# Patient Record
Sex: FEMALE | Race: BLACK OR AFRICAN AMERICAN | Employment: FULL TIME | URBAN - METROPOLITAN AREA
[De-identification: names, ages, dates, MRNs, and addresses within clinical notes are randomized per-mention and may not be internally consistent; named-entity substitution may affect disease eponyms.]

---

## 2017-03-23 ENCOUNTER — HOSPITAL ENCOUNTER (EMERGENCY)
Age: 42
Discharge: HOME | End: 2017-03-23
Attending: EMERGENCY MEDICINE
Payer: SELF-PAY

## 2017-03-23 VITALS
RESPIRATION RATE: 16 BRPM | DIASTOLIC BLOOD PRESSURE: 85 MMHG | BODY MASS INDEX: 26.11 KG/M2 | WEIGHT: 133 LBS | TEMPERATURE: 98.5 F | SYSTOLIC BLOOD PRESSURE: 133 MMHG | OXYGEN SATURATION: 97 % | HEIGHT: 60 IN

## 2017-03-23 DIAGNOSIS — J02.9 PHARYNGITIS, UNSPECIFIED ETIOLOGY: Primary | ICD-10-CM

## 2017-03-23 DIAGNOSIS — K12.0 CANKER SORES ORAL: ICD-10-CM

## 2017-03-23 PROCEDURE — 99282 EMERGENCY DEPT VISIT SF MDM: CPT

## 2017-03-23 RX ORDER — IBUPROFEN 600 MG/1
600 TABLET ORAL
Qty: 20 TAB | Refills: 0 | Status: SHIPPED | OUTPATIENT
Start: 2017-03-23 | End: 2017-07-14

## 2017-03-23 RX ORDER — AMOXICILLIN 500 MG/1
500 TABLET, FILM COATED ORAL 3 TIMES DAILY
Qty: 30 TAB | Refills: 0 | Status: SHIPPED | OUTPATIENT
Start: 2017-03-23 | End: 2017-07-14

## 2017-03-23 NOTE — DISCHARGE INSTRUCTIONS
Canker Sore: Care Instructions  Your Care Instructions  Canker sores are painful white sores in the mouth. They usually begin with a tingling feeling, followed by a red spot or bump that turns white. Canker sores appear most often on the tongue, inside the cheeks, and inside the lips. They can be very painful and can make talking, eating, and drinking difficult. A canker sore may form after an injury or stretching of tissues in the mouth, which can happen, for example, during a dental procedure or teeth cleaning. If you accidentally bite your tongue or the inside of your cheek, you may end up with a canker sore. Other possible causes are infection, certain foods, and stress. Canker sores are not contagious. The pain from your canker sore should decrease in 7 to 10 days, and it should heal completely in 1 to 3 weeks. In most cases, a canker sore will go away by itself. Home treatment can ease pain and discomfort. If you have a large or deep canker sore that does not seem to be getting better after 2 weeks, your doctor may prescribe medicine. Canker sores often come back again. Follow-up care is a key part of your treatment and safety. Be sure to make and go to all appointments, and call your doctor if you are having problems. Its also a good idea to know your test results and keep a list of the medicines you take. How can you care for yourself at home? · Drink cold liquids, such as water or iced tea, or eat flavored ice pops or frozen juices. Use a straw to keep the liquid from coming in contact with your canker sore. · Eat soft, bland foods that are easy to chew and swallow, such as ice cream, custard, applesauce, cottage cheese, macaroni and cheese, soft-cooked eggs, yogurt, or cream soups. · Cut foods into small pieces, or grind, mash, blend, or puree foods to make them easier to chew and swallow.   · While your canker sore heals, avoid coffee, chocolate, spicy and salty foods, citrus fruits, nuts, seeds, and tomatoes. · To soothe your canker sore and help it heal:  ¨ Use an over-the-counter numbing medicine, such as Orabase or Anbesol. ¨ Dab a bit of Milk of Magnesia on the canker sore 3 or 4 times a day. · Put ice on your sore to reduce the pain. · Take anti-inflammatory medicines to reduce pain, as needed. These include ibuprofen (Advil, Motrin) and naproxen (Aleve). Read and follow all instructions on the label. · Use a soft-bristle toothbrush, and brush your teeth well but carefully. · Do not smoke or use spit tobacco. Tobacco can cause mouth problems and slow healing. If you need help quitting, talk to your doctor about stop-smoking programs and medicines. These can increase your chances of quitting for good. When should you call for help? Call your doctor now or seek immediate medical care if:  · You have signs of infection, such as:  ¨ Increased pain, swelling, warmth, or redness. ¨ Red streaks leading from the canker sore. ¨ Pus draining from the canker sore. ¨ A fever. Watch closely for changes in your health, and be sure to contact your doctor if:  · Your canker sore gets worse or does not go away after 2 weeks of home treatment. · You get more canker sores. · You have any new symptoms, such as diarrhea, a headache, or a skin rash. Where can you learn more? Go to http://suzanne-simran.info/. Enter R111 in the search box to learn more about \"Canker Sore: Care Instructions. \"  Current as of: August 9, 2016  Content Version: 11.1  © 5871-1619 [employer]. Care instructions adapted under license by [employer] (which disclaims liability or warranty for this information). If you have questions about a medical condition or this instruction, always ask your healthcare professional. [place] [address] any warranty or liability for your use of this information.          Sore Throat: Care Instructions  Your Care Instructions    Infection by bacteria or a virus causes most sore throats. Cigarette smoke, dry air, air pollution, allergies, and yelling can also cause a sore throat. Sore throats can be painful and annoying. Fortunately, most sore throats go away on their own. If you have a bacterial infection, your doctor may prescribe antibiotics. Follow-up care is a key part of your treatment and safety. Be sure to make and go to all appointments, and call your doctor if you are having problems. It's also a good idea to know your test results and keep a list of the medicines you take. How can you care for yourself at home? · If your doctor prescribed antibiotics, take them as directed. Do not stop taking them just because you feel better. You need to take the full course of antibiotics. · Gargle with warm salt water once an hour to help reduce swelling and relieve discomfort. Use 1 teaspoon of salt mixed in 1 cup of warm water. · Take an over-the-counter pain medicine, such as acetaminophen (Tylenol), ibuprofen (Advil, Motrin), or naproxen (Aleve). Read and follow all instructions on the label. · Be careful when taking over-the-counter cold or flu medicines and Tylenol at the same time. Many of these medicines have acetaminophen, which is Tylenol. Read the labels to make sure that you are not taking more than the recommended dose. Too much acetaminophen (Tylenol) can be harmful. · Drink plenty of fluids. Fluids may help soothe an irritated throat. Hot fluids, such as tea or soup, may help decrease throat pain. · Use over-the-counter throat lozenges to soothe pain. Regular cough drops or hard candy may also help. These should not be given to young children because of the risk of choking. · Do not smoke or allow others to smoke around you. If you need help quitting, talk to your doctor about stop-smoking programs and medicines. These can increase your chances of quitting for good. · Use a vaporizer or humidifier to add moisture to your bedroom. Follow the directions for cleaning the machine. When should you call for help? Call your doctor now or seek immediate medical care if:  · You have new or worse trouble swallowing. · Your sore throat gets much worse on one side. Watch closely for changes in your health, and be sure to contact your doctor if you do not get better as expected. Where can you learn more? Go to http://suzanne-simran.info/. Enter [id number] [id number] in the search box to learn more about \"Sore Throat: Care Instructions. \"  Current as of: July 29, 2016  Content Version: 11.1  © 9826-8835 [employer]. Care instructions adapted under license by [employer] (which disclaims liability or warranty for this information). If you have questions about a medical condition or this instruction, always ask your healthcare professional. [address] any warranty or liability for your use of this information.

## 2017-03-23 NOTE — ED PROVIDER NOTES
HPI Comments: 10:28 AM Grady Lepe is a 35 y.o. female with a history of sickle cell trait and asthma who presents to the ED c/o cold sxs including body aches, fever, congestion, and HA since Wednesday. 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 Pt is also c/o cold sore and weakness in her legs, she thinks possibly due to being dehydrated onset Friday. No other acute concerns. The history is provided by the patient. Past Medical History:   Diagnosis Date    Asthma     Sickle cell trait ([facility] [address]        History reviewed. No pertinent surgical history. Family History:   Problem Relation Age of Onset    Diabetes Mother     Hypertension Mother     Diabetes Father     Hypertension Father     Stroke Father        Social History     Social History    Marital status:      Spouse name: N/A    Number of children: N/A    Years of education: N/A     Occupational History    [employer]     Social History Main Topics    Smoking status: Current Every Day Smoker     Packs/day: 0.25     Years: 15.00    Smokeless tobacco: Not on file    Alcohol use 1.0 - 1.5 oz/week     2 - 3 Cans of beer per week      Comment: occassionally    Drug use: Yes     Special: Marijuana    Sexual activity: Yes     Partners: Male     Birth control/ protection: Condom     Other Topics Concern    Not on file     Social History Narrative         ALLERGIES: Review of patient's allergies indicates no known allergies. Review of Systems   Constitutional: Positive for fever. HENT: Positive for congestion. Respiratory: Negative for cough and shortness of breath. Cardiovascular: Negative for chest pain and leg swelling. Gastrointestinal: Negative for abdominal pain, nausea and vomiting. Genitourinary: Negative for dysuria. Musculoskeletal: Negative. (+) Body aches   Neurological: Positive for headaches. Negative for speech difficulty.    All other systems reviewed and are negative. Vitals:    03/23/17 1008   BP: 133/85   Resp: 16   Temp: 98.5 °F (36.9 °C)   SpO2: 97%   Weight: 60.3 kg (133 lb)   Height: 5' (1.524 m)            Physical Exam   Constitutional: She is oriented to person, place, and time. No distress. HENT:   Head: Atraumatic. Eyes: Conjunctivae are normal.   Neck: Normal range of motion. Neck supple. Cardiovascular: Normal rate, regular rhythm and normal heart sounds. Pulmonary/Chest: Effort normal and breath sounds normal. She exhibits no tenderness. Abdominal: Soft. Bowel sounds are normal. She exhibits no distension. There is no tenderness. There is no rebound and no guarding. Musculoskeletal: Normal range of motion. She exhibits no edema or tenderness. Neurological: She is alert and oriented to person, place, and time. No cranial nerve deficit. Skin: Skin is warm and dry. Psychiatric: She has a normal mood and affect. Nursing note and vitals reviewed. MDM  Number of Diagnoses or Management Options  Canker sores oral:   Pharyngitis, unspecified etiology:      Amount and/or Complexity of Data Reviewed  Clinical lab tests: ordered and reviewed    Risk of Complications, Morbidity, and/or Mortality  Presenting problems: low  Diagnostic procedures: low  Management options: low      ED Course       Procedures  . Vitals:  Patient Vitals for the past 12 hrs:   Temp Resp BP SpO2   03/23/17 1008 98.5 °F (36.9 °C) 16 133/85 97 %       Progress Notes: 10:32 AM Pt evaluated at this time and is resting comfortably in NAD. Discussed results and findings, as well as, diagnosis and plan for discharge. Pt verbalizes understanding and agreement with plan. All questions addressed at this time. Disposition: DC    Diagnosis:     Follow-up Information     None          Scribe Attestation:     Kaitlynn Jurado, scrjasoning for and in the presence of Savanna Moore MD March 23, 2017     Signed by:  Jan Barahona, March 23, 2017 at 10:32 AM     Physician Attestation:   I personally performed the services described in this documentation, reviewed and edited the documentation which was dictated to the scribe in my presence, and it accurately records my words and actions.  Regine Jansen MD  March 23, 2017

## 2017-03-23 NOTE — ED NOTES
Alonzo Shafer is a 35 y.o. female that was discharged in stable. Pt was accompanied by self. Pt is driving. The patients diagnosis, condition and treatment were explained to  patient and aftercare instructions were given. The patient verbalized understanding. Patient armband removed and shredded.

## 2017-03-23 NOTE — LETTER
NOTIFICATION RETURN TO WORK / SCHOOL 
 
3/23/2017 10:33 AM 
 
Ms. Esequiel Burgess [facility] To Whom It May Concern: 
 
Esequiel Burgess is currently under the care of [id number] [facility] EMERGENCY DEPT. She will return to motherhood on 3/27/2017or until symptoms subside. If there are questions or concerns please have the patient contact our office. Sincerely, Bryan Jewell MD

## 2017-07-14 ENCOUNTER — HOSPITAL ENCOUNTER (EMERGENCY)
Age: 42
Discharge: HOME | End: 2017-07-14
Attending: EMERGENCY MEDICINE | Admitting: EMERGENCY MEDICINE
Payer: SELF-PAY

## 2017-07-14 ENCOUNTER — APPOINTMENT (OUTPATIENT)
Dept: GENERAL RADIOLOGY | Age: 42
End: 2017-07-14
Attending: EMERGENCY MEDICINE
Payer: SELF-PAY

## 2017-07-14 VITALS
DIASTOLIC BLOOD PRESSURE: 99 MMHG | RESPIRATION RATE: 18 BRPM | WEIGHT: 124 LBS | BODY MASS INDEX: 24.35 KG/M2 | OXYGEN SATURATION: 99 % | SYSTOLIC BLOOD PRESSURE: 144 MMHG | HEART RATE: 80 BPM | TEMPERATURE: 98 F | HEIGHT: 60 IN

## 2017-07-14 DIAGNOSIS — S39.012A BACK STRAIN, INITIAL ENCOUNTER: Primary | ICD-10-CM

## 2017-07-14 PROCEDURE — 73030 X-RAY EXAM OF SHOULDER: CPT

## 2017-07-14 PROCEDURE — 99282 EMERGENCY DEPT VISIT SF MDM: CPT

## 2017-07-14 RX ORDER — CYCLOBENZAPRINE HCL 5 MG
5 TABLET ORAL
Qty: 9 TAB | Refills: 0 | Status: SHIPPED | OUTPATIENT
Start: 2017-07-14 | End: 2017-07-17

## 2017-07-14 NOTE — LETTER
[facility] EMERGENCY DEPT 
[address] [facility] [id number] [phone] Work/School Note Date: 7/14/2017 To Whom It May concern: 
 
Kavitha Carmen was seen and treated today in the emergency room by the following provider(s): 
Attending Provider: Rajeev Brewster DO Physician Assistant: GENET Alanis. Kavitha Carmen may return to work on 7/16/17. Yola Sage Sincerely, GENET Alanis
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
## 2017-07-15 NOTE — ED NOTES
Patient advised to f/u with primary care related to elevated blood pressures. She voiced understanding.

## 2017-07-15 NOTE — DISCHARGE INSTRUCTIONS
Back Strain: Care Instructions  Your Care Instructions    Back strain happens when you overstretch, or pull, a muscle in your back. You may hurt your back in an accident or when you exercise or lift something. Most back pain will get better with rest and time. You can take care of yourself at home to help your back heal.  Follow-up care is a key part of your treatment and safety. Be sure to make and go to all appointments, and call your doctor if you are having problems. It's also a good idea to know your test results and keep a list of the medicines you take. How can you care for yourself at home? · Try to stay as active as you can, but stop or reduce any activity that causes pain. · Put ice or a cold pack on the sore muscle for 10 to 20 minutes at a time to stop swelling. Try this every 1 to 2 hours for 3 days (when you are awake) or until the swelling goes down. Put a thin cloth between the ice pack and your skin. · After 2 or 3 days, apply a heating pad on low or a warm cloth to your back. Some doctors suggest that you go back and forth between hot and cold treatments. · Take pain medicines exactly as directed. ¨ If the doctor gave you a prescription medicine for pain, take it as prescribed. ¨ If you are not taking a prescription pain medicine, ask your doctor if you can take an over-the-counter medicine. · Try sleeping on your side with a pillow between your legs. Or put a pillow under your knees when you lie on your back. These measures can ease pain in your lower back. · Return to your usual level of activity slowly. When should you call for help? Call 911 anytime you think you may need emergency care. For example, call if:  · You are unable to move a leg at all. Call your doctor now or seek immediate medical care if:  · You have new or worse symptoms in your legs, belly, or buttocks. Symptoms may include:  ¨ Numbness or tingling. ¨ Weakness. ¨ Pain.   · You lose bladder or bowel control. Watch closely for changes in your health, and be sure to contact your doctor if you are not getting better as expected. Where can you learn more? Go to http://suzanne-simran.info/. Enter X679 in the search box to learn more about \"Back Strain: Care Instructions. \"  Current as of: March 21, 2017  Content Version: 11.3  © 3725-0818 [employer]. Care instructions adapted under license by [employer] (which disclaims liability or warranty for this information). If you have questions about a medical condition or this instruction, always ask your healthcare professional. [place] [address] any warranty or liability for your use of this information.

## 2017-10-27 ENCOUNTER — HOSPITAL ENCOUNTER (EMERGENCY)
Age: 42
Discharge: HOME | End: 2017-10-27
Attending: EMERGENCY MEDICINE
Payer: SELF-PAY

## 2017-10-27 VITALS
WEIGHT: 130 LBS | DIASTOLIC BLOOD PRESSURE: 99 MMHG | SYSTOLIC BLOOD PRESSURE: 173 MMHG | OXYGEN SATURATION: 98 % | TEMPERATURE: 98.2 F | HEIGHT: 60 IN | RESPIRATION RATE: 18 BRPM | HEART RATE: 78 BPM | BODY MASS INDEX: 25.52 KG/M2

## 2017-10-27 DIAGNOSIS — M54.2 NECK PAIN: Primary | ICD-10-CM

## 2017-10-27 DIAGNOSIS — T14.8XXA MUSCLE STRAIN: ICD-10-CM

## 2017-10-27 DIAGNOSIS — R03.0 ELEVATED BLOOD PRESSURE READING: ICD-10-CM

## 2017-10-27 PROCEDURE — 99283 EMERGENCY DEPT VISIT LOW MDM: CPT

## 2017-10-27 PROCEDURE — 74011250637 HC RX REV CODE- 250/637: Performed by: PHYSICIAN ASSISTANT

## 2017-10-27 RX ORDER — DIAZEPAM 5 MG/1
5 TABLET ORAL
Qty: 12 TAB | Refills: 0 | Status: SHIPPED | OUTPATIENT
Start: 2017-10-27 | End: 2018-03-11

## 2017-10-27 RX ORDER — IBUPROFEN 600 MG/1
600 TABLET ORAL
Status: COMPLETED | OUTPATIENT
Start: 2017-10-27 | End: 2017-10-27

## 2017-10-27 RX ADMIN — IBUPROFEN 600 MG: 600 TABLET ORAL at 15:40

## 2017-10-27 NOTE — DISCHARGE INSTRUCTIONS
Neck Pain: Care Instructions  Your Care Instructions    You can have neck pain anywhere from the bottom of your head to the top of your shoulders. It can spread to the upper back or arms. Injuries, painting a ceiling, sleeping with your neck twisted, staying in one position for too long, and many other activities can cause neck pain. Most neck pain gets better with home care. Your doctor may recommend medicine to relieve pain or relax your muscles. He or she may suggest exercise and physical therapy to increase flexibility and relieve stress. You may need to wear a special (cervical) collar to support your neck for a day or two. Follow-up care is a key part of your treatment and safety. Be sure to make and go to all appointments, and call your doctor if you are having problems. It's also a good idea to know your test results and keep a list of the medicines you take. How can you care for yourself at home? · Try using a heating pad on a low or medium setting for 15 to 20 minutes every 2 or 3 hours. Try a warm shower in place of one session with the heating pad. · You can also try an ice pack for 10 to 15 minutes every 2 to 3 hours. Put a thin cloth between the ice and your skin. · Take pain medicines exactly as directed. ¨ If the doctor gave you a prescription medicine for pain, take it as prescribed. ¨ If you are not taking a prescription pain medicine, ask your doctor if you can take an over-the-counter medicine. · If your doctor recommends a cervical collar, wear it exactly as directed. When should you call for help? Call your doctor now or seek immediate medical care if:  ? · You have new or worsening numbness in your arms, buttocks or legs. ? · You have new or worsening weakness in your arms or legs. (This could make it hard to stand up.)   ? · You lose control of your bladder or bowels. ? Watch closely for changes in your health, and be sure to contact your doctor if:  ? · Your neck pain is getting worse. ? · You are not getting better after 1 week. ? · You do not get better as expected. Where can you learn more? Go to http://suzanne-simran.info/. Enter [id number] in the search box to learn more about \"Neck Pain: Care Instructions. \"  Current as of: March 21, 2017  Content Version: 11.4  © 2266-1756 [employer]. Care instructions adapted under license by [employer] (which disclaims liability or warranty for this information). If you have questions about a medical condition or this instruction, always ask your healthcare professional. [address] any warranty or liability for your use of this information.

## 2017-10-27 NOTE — LETTER
NOTIFICATION RETURN TO WORK / SCHOOL 
 
10/27/2017 3:31 PM 
 
Ms. Mily Dodson [address] [facility] To Whom It May Concern: 
 
Mily Dodson is currently under the care of [id number] [facility] EMERGENCY DEPT. She will return to work/school on 10/29/17 If there are questions or concerns please have the patient contact our office.  
 
 
 
Sincerely, 
 
GENET Earl

## 2017-10-27 NOTE — ED PROVIDER NOTES
HPI Comments: Aure Bear is a 35 y.o. Female c/o Rt sided, sharp, constant neck pain, non-radiating, 5/10, started 10 days ago, atraumatic. No recent falls. Pt reports pain started after moving boxes/furniture at home after death of her son. Pt states she feels the rt side of her neck is swollen. Pt also tried heating pad for pain. Tried Tylenol, Motrin, and Aleve for pain before bed. Pt works at [employer] and has to lift boxes. Pt denies numbness/tingling to UEs. The history is provided by the patient. Past Medical History:   Diagnosis Date    Asthma     Sickle cell trait ([facility] [address]        No past surgical history on file. Family History:   Problem Relation Age of Onset    Diabetes Mother     Hypertension Mother     Diabetes Father     Hypertension Father     Stroke Father        Social History     Social History    Marital status:      Spouse name: N/A    Number of children: N/A    Years of education: N/A     Occupational History    [email or web address]     Social History Main Topics    Smoking status: Current Every Day Smoker     Packs/day: 0.25     Years: 15.00    Smokeless tobacco: Not on file    Alcohol use 1.0 - 1.5 oz/week     2 - 3 Cans of beer per week      Comment: occassionally    Drug use: No    Sexual activity: Yes     Partners: Male     Birth control/ protection: Condom     Other Topics Concern    Not on file     Social History Narrative         ALLERGIES: Review of patient's allergies indicates no known allergies. Review of Systems   Constitutional: Negative for chills, fatigue and fever. Cardiovascular: Negative for chest pain and palpitations. Gastrointestinal: Negative for abdominal pain, diarrhea and nausea. Musculoskeletal: Positive for neck pain and neck stiffness. Negative for back pain and gait problem. Skin: Negative for rash. Neurological: Negative for weakness and numbness.         No tingling to extremities       There were no vitals filed for this visit. Physical Exam   Constitutional: She is oriented to person, place, and time. She appears well-developed and well-nourished. Appears in mild distress due to pain   HENT:   Head: Normocephalic and atraumatic. Eyes: EOM are normal. Pupils are equal, round, and reactive to light. Neck: Muscular tenderness present. Decreased range of motion present. Cardiovascular: Normal rate and regular rhythm. Pulmonary/Chest: Effort normal and breath sounds normal.   Musculoskeletal:        Cervical back: She exhibits decreased range of motion and tenderness. She exhibits no bony tenderness and no swelling. Back:    Decreased neck rotation, flexion, and extension due to Rt sided pain  Point tenderness to Rt SCM and trapezius. Neurological: She is alert and oriented to person, place, and time. Skin: Skin is warm and intact. MDM  Number of Diagnoses or Management Options  Elevated blood pressure reading:   Muscle strain:   Neck pain:   Diagnosis management comments: 10 days of atraumatic, rt sided neck pain and stiffness exacerbated by lifting. No sensory deficits, no weakness. Musculoskeletal pain. Give NSAIDs and muscle relaxant. Risk of Complications, Morbidity, and/or Mortality  Presenting problems: low  Diagnostic procedures: low  Management options: low    Patient Progress  Patient progress: stable    ED Course       Procedures      Vitals:  Patient Vitals for the past 12 hrs:   Temp Pulse Resp BP SpO2   10/27/17 1504 98.2 °F (36.8 °C) 78 18 (!) 173/99 98 %         Medications ordered:   Medications   ibuprofen (MOTRIN) tablet 600 mg (not administered)         Lab findings:  No results found for this or any previous visit (from the past 12 hour(s)). X-Ray, CT or other radiology findings or impressions:  No orders to display       Progress notes, Consult notes or additional Procedure notes:       Disposition:  Diagnosis: No diagnosis found.     Disposition: home    Follow-up Information     None           Patient's Medications    No medications on file

## 2017-10-27 NOTE — ED TRIAGE NOTES
C/o right sided neck pain x ~1 week after heavy lifting at work. States taking Tylenol, Aleve & Ibuprofen.

## 2018-03-11 ENCOUNTER — HOSPITAL ENCOUNTER (EMERGENCY)
Age: 43
Discharge: HOME | End: 2018-03-11
Attending: EMERGENCY MEDICINE | Admitting: EMERGENCY MEDICINE
Payer: SELF-PAY

## 2018-03-11 VITALS
BODY MASS INDEX: 25.86 KG/M2 | DIASTOLIC BLOOD PRESSURE: 94 MMHG | WEIGHT: 137 LBS | OXYGEN SATURATION: 99 % | HEIGHT: 61 IN | TEMPERATURE: 98.6 F | RESPIRATION RATE: 20 BRPM | SYSTOLIC BLOOD PRESSURE: 145 MMHG | HEART RATE: 75 BPM
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
## 2018-03-11 DIAGNOSIS — S16.1XXA STRAIN OF NECK MUSCLE, INITIAL ENCOUNTER: ICD-10-CM

## 2018-03-11 DIAGNOSIS — R03.0 ELEVATED BLOOD PRESSURE READING: ICD-10-CM

## 2018-03-11 DIAGNOSIS — M62.838 MUSCLE SPASM: ICD-10-CM

## 2018-03-11 PROCEDURE — 99282 EMERGENCY DEPT VISIT SF MDM: CPT

## 2018-03-11 RX ORDER — CYCLOBENZAPRINE HCL 10 MG
10 TABLET ORAL
Qty: 12 TAB | Refills: 0 | Status: SHIPPED | OUTPATIENT
Start: 2018-03-11 | End: 2018-10-05

## 2018-03-11 RX ORDER — IBUPROFEN 600 MG/1
600 TABLET ORAL
Qty: 20 TAB | Refills: 0 | Status: SHIPPED | OUTPATIENT
Start: 2018-03-11 | End: 2018-10-05

## 2018-03-11 NOTE — LETTER
[address] [place] [address] [facility] 
[address] [facility] [address] [phone] Work/School Note Date: 3/11/2018 To Whom It May concern: 
 
Leland Philippe was seen and treated today in the emergency room by the following provider(s): 
Attending Provider: Reyes Granda MD 
Nurse Practitioner: Leah Clarke NP. Leland Philippe may return to work on 03/12/2018. Sincerely, Leah Clarke NP
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
## 2018-10-05 ENCOUNTER — APPOINTMENT (OUTPATIENT)
Dept: GENERAL RADIOLOGY | Age: 43
End: 2018-10-05
Attending: PHYSICIAN ASSISTANT
Payer: SUBSIDIZED

## 2018-10-05 ENCOUNTER — HOSPITAL ENCOUNTER (EMERGENCY)
Age: 43
Discharge: HOME | End: 2018-10-05
Attending: EMERGENCY MEDICINE
Payer: SUBSIDIZED

## 2018-10-05 ENCOUNTER — APPOINTMENT (OUTPATIENT)
Dept: CT IMAGING | Age: 43
End: 2018-10-05
Attending: PHYSICIAN ASSISTANT
Payer: SUBSIDIZED

## 2018-10-05 VITALS
WEIGHT: 139 LBS | BODY MASS INDEX: 27.29 KG/M2 | DIASTOLIC BLOOD PRESSURE: 93 MMHG | RESPIRATION RATE: 18 BRPM | SYSTOLIC BLOOD PRESSURE: 149 MMHG | HEART RATE: 82 BPM | TEMPERATURE: 98.7 F | OXYGEN SATURATION: 98 % | HEIGHT: 60 IN

## 2018-10-05 DIAGNOSIS — S39.012A BACK STRAIN, INITIAL ENCOUNTER: ICD-10-CM
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
## 2018-10-05 DIAGNOSIS — S16.1XXA NECK STRAIN, INITIAL ENCOUNTER: Primary | ICD-10-CM

## 2018-10-05 PROCEDURE — 74011250637 HC RX REV CODE- 250/637: Performed by: PHYSICIAN ASSISTANT

## 2018-10-05 PROCEDURE — 72100 X-RAY EXAM L-S SPINE 2/3 VWS: CPT

## 2018-10-05 PROCEDURE — 72072 X-RAY EXAM THORAC SPINE 3VWS: CPT

## 2018-10-05 PROCEDURE — 99282 EMERGENCY DEPT VISIT SF MDM: CPT

## 2018-10-05 PROCEDURE — 72125 CT NECK SPINE W/O DYE: CPT

## 2018-10-05 RX ORDER — IBUPROFEN 600 MG/1
600 TABLET ORAL
Status: COMPLETED | OUTPATIENT
Start: 2018-10-05 | End: 2018-10-05

## 2018-10-05 RX ORDER — ACETAMINOPHEN 500 MG
500 TABLET ORAL
Status: COMPLETED | OUTPATIENT
Start: 2018-10-05 | End: 2018-10-05

## 2018-10-05 RX ORDER — CYCLOBENZAPRINE HCL 5 MG
10 TABLET ORAL 3 TIMES DAILY
Qty: 18 TAB | Refills: 0 | Status: SHIPPED | OUTPATIENT
Start: 2018-10-05 | End: 2018-10-08

## 2018-10-05 RX ORDER — IBUPROFEN 600 MG/1
600 TABLET ORAL
Qty: 20 TAB | Refills: 0 | Status: SHIPPED | OUTPATIENT
Start: 2018-10-05 | End: 2019-04-10

## 2018-10-05 RX ADMIN — IBUPROFEN 600 MG: 600 TABLET ORAL at 19:00

## 2018-10-05 RX ADMIN — ACETAMINOPHEN 500 MG: 500 TABLET ORAL at 19:00
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
## 2019-04-10 ENCOUNTER — HOSPITAL ENCOUNTER (EMERGENCY)
Age: 44
Discharge: HOME | End: 2019-04-10
Attending: EMERGENCY MEDICINE
Payer: MEDICAID

## 2019-04-10 VITALS
RESPIRATION RATE: 18 BRPM | OXYGEN SATURATION: 99 % | BODY MASS INDEX: 27.09 KG/M2 | TEMPERATURE: 98.7 F | DIASTOLIC BLOOD PRESSURE: 109 MMHG | HEIGHT: 60 IN | HEART RATE: 83 BPM | WEIGHT: 138 LBS | SYSTOLIC BLOOD PRESSURE: 156 MMHG

## 2019-04-10 DIAGNOSIS — R03.0 ELEVATED BLOOD PRESSURE READING: ICD-10-CM

## 2019-04-10 DIAGNOSIS — J01.80 OTHER ACUTE SINUSITIS, RECURRENCE NOT SPECIFIED: Primary | ICD-10-CM

## 2019-04-10 PROCEDURE — 99282 EMERGENCY DEPT VISIT SF MDM: CPT

## 2019-04-10 RX ORDER — LORATADINE 10 MG/1
10 TABLET ORAL
COMMUNITY
End: 2019-09-17

## 2019-04-10 RX ORDER — IBUPROFEN 800 MG/1
800 TABLET ORAL
Qty: 16 TAB | Refills: 0 | Status: SHIPPED | OUTPATIENT
Start: 2019-04-10 | End: 2019-04-17

## 2019-04-10 RX ORDER — IBUPROFEN 200 MG
400 TABLET ORAL
COMMUNITY
End: 2019-04-10

## 2019-04-10 RX ORDER — FLUTICASONE PROPIONATE 50 MCG
2 SPRAY, SUSPENSION (ML) NASAL
Qty: 1 BOTTLE | Refills: 0 | Status: SHIPPED | OUTPATIENT
Start: 2019-04-10 | End: 2019-09-17

## 2019-04-10 RX ORDER — AZITHROMYCIN 250 MG/1
TABLET, FILM COATED ORAL
Qty: 6 TAB | Refills: 0 | Status: SHIPPED | OUTPATIENT
Start: 2019-04-10 | End: 2019-04-15

## 2019-04-10 RX ORDER — DIPHENHYDRAMINE HCL 25 MG
25 CAPSULE ORAL
COMMUNITY
End: 2019-09-17

## 2019-04-10 NOTE — DISCHARGE INSTRUCTIONS
Patient Education     If you were prescribed any medication take as directed. Follow up with your primary care physician or with specialist as directed. Return to the emergency room with any new or worsening conditions. Elevated Blood Pressure: Care Instructions  Your Care Instructions    Blood pressure is a measure of how hard the blood pushes against the walls of your arteries. It's normal for blood pressure to go up and down throughout the day. But if it stays up over time, you have high blood pressure. Two numbers tell you your blood pressure. The first number is the systolic pressure. It shows how hard the blood pushes when your heart is pumping. The second number is the diastolic pressure. It shows how hard the blood pushes between heartbeats, when your heart is relaxed and filling with blood. An ideal blood pressure in adults is less than 120/80 (say \"120 over 80\"). High blood pressure is 140/90 or higher. You have high blood pressure if your top number is 140 or higher or your bottom number is 90 or higher, or both. The main test for high blood pressure is simple, fast, and painless. To diagnose high blood pressure, your doctor will test your blood pressure at different times. After testing your blood pressure, your doctor may ask you to test it again when you are home. If you are diagnosed with high blood pressure, you can work with your doctor to make a long-term plan to manage it. Follow-up care is a key part of your treatment and safety. Be sure to make and go to all appointments, and call your doctor if you are having problems. It's also a good idea to know your test results and keep a list of the medicines you take. How can you care for yourself at home? · Do not smoke. Smoking increases your risk for heart attack and stroke. If you need help quitting, talk to your doctor about stop-smoking programs and medicines. These can increase your chances of quitting for good.   · Stay at a healthy weight. · Try to limit how much sodium you eat to less than 2,300 milligrams (mg) a day. Your doctor may ask you to try to eat less than 1,500 mg a day. · Be physically active. Get at least 30 minutes of exercise on most days of the week. Walking is a good choice. You also may want to do other activities, such as running, swimming, cycling, or playing tennis or team sports. · Avoid or limit alcohol. Talk to your doctor about whether you can drink any alcohol. · Eat plenty of fruits, vegetables, and low-fat dairy products. Eat less saturated and total fats. · Learn how to check your blood pressure at home. When should you call for help? Call your doctor now or seek immediate medical care if:  ? · Your blood pressure is much higher than normal (such as 180/110 or higher). ? · You think high blood pressure is causing symptoms such as:  ¨ Severe headache. ¨ Blurry vision. ? Watch closely for changes in your health, and be sure to contact your doctor if:  ? · You do not get better as expected. Where can you learn more? Go to http://suzanne-simran.info/. Enter J932 in the search box to learn more about \"Elevated Blood Pressure: Care Instructions. \"  Current as of: September 21, 2016  Content Version: 11.4  © 8145-9479 [employer]. Care instructions adapted under license by [employer] (which disclaims liability or warranty for this information). If you have questions about a medical condition or this instruction, always ask your healthcare professional. [place] [address] any warranty or liability for your use of this information. Saline Nasal Washes: Care Instructions  Your Care Instructions  Saline nasal washes help keep the nasal passages open by washing out thick or dried mucus. This simple remedy can help relieve symptoms of allergies, sinusitis, and colds.  It also can make the nose feel more comfortable by keeping the mucous membranes moist. You may notice a little burning sensation in your nose the first few times you use the solution, but this usually gets better in a few days. Follow-up care is a key part of your treatment and safety. Be sure to make and go to all appointments, and call your doctor if you are having problems. It's also a good idea to know your test results and keep a list of the medicines you take. How can you care for yourself at home? · You can buy premixed saline solution in a squeeze bottle or other sinus rinse products at a drugstore. Read and follow the instructions on the label. · You also can make your own saline solution by adding 1 teaspoon of salt and 1 teaspoon of baking soda to 2 cups of distilled water. · If you use a homemade solution, pour a small amount into a clean bowl. Using a rubber bulb syringe, squeeze the syringe and place the tip in the salt water. Pull a small amount of the salt water into the syringe by relaxing your hand. · Sit down with your head tilted slightly back. Do not lie down. Put the tip of the bulb syringe or the squeeze bottle a little way into one of your nostrils. Gently drip or squirt a few drops into the nostril. Repeat with the other nostril. Some sneezing and gagging are normal at first.  · Gently blow your nose. · Wipe the syringe or bottle tip clean after each use. · Repeat this 2 or 3 times a day. · Use nasal washes gently if you have nosebleeds often. When should you call for help? Watch closely for changes in your health, and be sure to contact your doctor if:    · You often get nosebleeds.     · You have problems doing the nasal washes. Where can you learn more? Go to http://szuanne-simran.info/. Enter [id number] in the search box to learn more about \"Saline Nasal Washes: Care Instructions. \"  Current as of: March 27, 2018  Content Version: 11.9  © 4977-8814 [employer].  Care instructions adapted under license by [employer] (which disclaims liability or warranty for this information). If you have questions about a medical condition or this instruction, always ask your healthcare professional. [address] any warranty or liability for your use of this information. Patient Education        Sinusitis: Care Instructions  Your Care Instructions    Sinusitis is an infection of the lining of the sinus cavities in your head. Sinusitis often follows a cold. It causes pain and pressure in your head and face. In most cases, sinusitis gets better on its own in 1 to 2 weeks. But some mild symptoms may last for several weeks. Sometimes antibiotics are needed. Follow-up care is a key part of your treatment and safety. Be sure to make and go to all appointments, and call your doctor if you are having problems. It's also a good idea to know your test results and keep a list of the medicines you take. How can you care for yourself at home? · Take an over-the-counter pain medicine, such as acetaminophen (Tylenol), ibuprofen (Advil, Motrin), or naproxen (Aleve). Read and follow all instructions on the label. · If the doctor prescribed antibiotics, take them as directed. Do not stop taking them just because you feel better. You need to take the full course of antibiotics. · Be careful when taking over-the-counter cold or flu medicines and Tylenol at the same time. Many of these medicines have acetaminophen, which is Tylenol. Read the labels to make sure that you are not taking more than the recommended dose. Too much acetaminophen (Tylenol) can be harmful. · Breathe warm, moist air from a steamy shower, a hot bath, or a sink filled with hot water. Avoid cold, dry air. Using a humidifier in your home may help. Follow the directions for cleaning the machine. · Use saline (saltwater) nasal washes to help keep your nasal passages open and wash out mucus and bacteria.  You can buy saline nose drops at a grocery store or drugstore. Or you can make your own at home by adding 1 teaspoon of salt and 1 teaspoon of baking soda to 2 cups of distilled water. If you make your own, fill a bulb syringe with the solution, insert the tip into your nostril, and squeeze gently. Jerad Payam your nose. · Put a hot, wet towel or a warm gel pack on your face 3 or 4 times a day for 5 to 10 minutes each time. · Try a decongestant nasal spray like oxymetazoline (Afrin). Do not use it for more than 3 days in a row. Using it for more than 3 days can make your congestion worse. When should you call for help? Call your doctor now or seek immediate medical care if:    · You have new or worse swelling or redness in your face or around your eyes.     · You have a new or higher fever.    Watch closely for changes in your health, and be sure to contact your doctor if:    · You have new or worse facial pain.     · The mucus from your nose becomes thicker (like pus) or has new blood in it.     · You are not getting better as expected. Where can you learn more? Go to http://suzanne-simran.info/. Enter H740 in the search box to learn more about \"Sinusitis: Care Instructions. \"  Current as of: March 27, 2018  Content Version: 11.9  © 4976-8674 [employer]. Care instructions adapted under license by [employer] (which disclaims liability or warranty for this information). If you have questions about a medical condition or this instruction, always ask your healthcare professional. [place] [address] any warranty or liability for your use of this information.

## 2019-04-10 NOTE — ED PROVIDER NOTES
EMERGENCY DEPARTMENT HISTORY AND PHYSICAL EXAM 
 
1:20 PM 
 
 
Date: 4/10/2019 Patient Name: Janice Velazquez History of Presenting Illness Chief Complaint Patient presents with  Sinus Pain  Nasal Swelling History Provided By: Patient Additional History (Context): Janice Velazquez is a 37 y.o. female with past medical history of seasonal allergies, asthma and Sickle cell trait who presents with complaint of nasal congestion runny nose for the past few days. Now she states that there is some swelling on the left side of her nose. States it is painful when she blows her nose. She denies any headache, dizziness, fever or chills, or sore throat but does have tender lymph node on the left side of her neck she states. She mentioned that she used some new eye makeup recently but no rash or itching. No other complaints PCP: None Past History Past Medical History: 
Past Medical History:  
Diagnosis Date  Asthma  Sickle cell trait ([address] Past Surgical History: 
History reviewed. No pertinent surgical history. Family History: 
Family History Problem Relation Age of Onset  Diabetes Mother  Hypertension Mother  Diabetes Father  Hypertension Father  Stroke Father Social History: 
Social History Tobacco Use  Smoking status: Current Every Day Smoker Packs/day: 0.25 Years: 15.00 Pack years: 3.75  Smokeless tobacco: Never Used Substance Use Topics  Alcohol use: Yes Alcohol/week: 1.0 - 1.5 oz Types: 2 - 3 Cans of beer per week Comment: occassionally  Drug use: No  
  Types: Marijuana Allergies: 
No Known Allergies Review of Systems Review of Systems Constitutional: Negative for chills and fever. HENT: Positive for congestion and rhinorrhea. Negative for sore throat and trouble swallowing. Eyes: Negative for visual disturbance. Respiratory: Negative for cough, shortness of breath and wheezing. Cardiovascular: Negative for chest pain. Gastrointestinal: Negative for abdominal pain, nausea and vomiting. Endocrine: Negative for polyuria. Genitourinary: Negative for dysuria. Musculoskeletal: Negative for arthralgias and neck stiffness. Skin: Negative for pallor and rash. Neurological: Negative for dizziness, weakness, numbness and headaches. Hematological: Does not bruise/bleed easily. Psychiatric/Behavioral: Negative for confusion and dysphoric mood. All other systems reviewed and are negative. Physical Exam  
 
Visit Vitals BP (!) 156/109 (BP 1 Location: Left arm, BP Patient Position: Sitting) Pulse 83 Temp 98.7 °F (37.1 °C) Resp 18 Ht 5' (1.524 m) Wt 62.6 kg (138 lb) LMP 03/27/2019 SpO2 99% BMI 26.95 kg/m² Physical Exam  
Constitutional: She is oriented to person, place, and time. She appears well-developed and well-nourished. No distress. HENT:  
Head: Normocephalic and atraumatic. Mouth/Throat: Oropharynx is clear and moist.  
Tender left paranasal sinus region Some mild swelling at that region Purulent drainage from both nares, more in the left nares Swollen turbinates Eyes: Pupils are equal, round, and reactive to light. Conjunctivae are normal. No scleral icterus. Neck: Normal range of motion. Neck supple. Cardiovascular: Normal rate and intact distal pulses. Pulmonary/Chest: Effort normal and breath sounds normal. No respiratory distress. She has no wheezes. Musculoskeletal: Normal range of motion. She exhibits no edema. Lymphadenopathy:  
  She has cervical adenopathy (Left-sided submandibular). Neurological: She is alert and oriented to person, place, and time. No cranial nerve deficit. Coordination normal.  
Skin: Skin is warm and dry. She is not diaphoretic. Psychiatric: Her behavior is normal.  
Nursing note and vitals reviewed. Diagnostic Study Results Labs - No results found for this or any previous visit (from the past 12 hour(s)). Radiologic Studies - No orders to display Medical Decision Making I am the first provider for this patient. I reviewed the vital signs, available nursing notes, past medical history, past surgical history, family history and social history. Vital Signs-Reviewed the patient's vital signs. Provider Notes (Medical Decision Making): DDX: Acute sinusitis, allergic rhinitis We will treat for sinusitis with antibiotic, Flonase, saline nasal spray, Motrin Give  follow-up as well as PCP referral in [address] [place] [address] Medications - No data to display ED Course: Progress Notes, Reevaluation, and Consults: 
I have reassessed the patient. I have discussed the workup, results and plan with the patient and patient is in agreement. Patient has no new complaints. Patient will be prescribed Z-Jordana, Motrin, Flonase, saline nasal spray. Patient was discharge in stable condition. Patient was given outpatient follow up. Patient is to return to emergency department if any new or worsening condition. Diagnosis Clinical Impression: 1. Other acute sinusitis, recurrence not specified Disposition: Discharged Follow-up Information Follow up With Specialties Details Why Contact Info Dea [address]  Call today  DR. [facility] [address] [address] 
[phone] [facility]  Schedule an appointment as soon as possible for a visit in 2 days  [address] [facility] [address] [facility] [address] 
[phone] [address] [facility] EMERGENCY DEPT Emergency Medicine  As needed, If symptoms worsen [address] [place] [address] [id number] [phone] Patient's Medications Start Taking AZITHROMYCIN (ZITHROMAX Z-JORDANA) 250 MG TABLET    Take as written FLUTICASONE PROPIONATE (FLONASE) 50 MCG/ACTUATION NASAL SPRAY    2 Sprays by Both Nostrils route daily as needed for Rhinitis or Allergies. Indications: inflammation of the nose due to an allergy IBUPROFEN (MOTRIN) 800 MG TABLET    Take 1 Tab by mouth every six (6) hours as needed (pain; fever) for up to 7 days. SODIUM CHLORIDE (SALINE NASAL) 0.65 % NASAL SQUEEZE BOTTLE    0.1 mL by Both Nostrils route every three (3) hours as needed for Congestion. Indications: stuffy nose Continue Taking DIPHENHYDRAMINE (BENADRYL) 25 MG CAPSULE    Take 25 mg by mouth every six (6) hours as needed. LORATADINE (CLARITIN) 10 MG TABLET    Take 10 mg by mouth. These Medications have changed No medications on file Stop Taking IBUPROFEN (MOTRIN IB) 200 MG TABLET    Take 400 mg by mouth. IBUPROFEN (MOTRIN) 600 MG TABLET    Take 1 Tab by mouth every six (6) hours as needed for Pain. DO Boby Berg medical dictation software was used for portions of this report. Unintended transcription errors may occur. My signature above authenticates this document and my orders, the final   
diagnosis (es), discharge prescription (s), and instructions in the Epic   
record. If you have any questions please contact [phone].

## 2019-04-10 NOTE — LETTER
NOTIFICATION RETURN TO WORK / SCHOOL 
 
4/10/2019 2:40 PM 
 
Ms. Suzan Leblanc [address] [facility] To Whom It May Concern: 
 
Suzan Leblanc is currently under the care of [facility] EMERGENCY DEPT. She will return to work/school on: 4/12/19 If there are questions or concerns please have the patient contact our office. Sincerely, 
 
 
Dr. Yordy Manuel

## 2019-06-14 ENCOUNTER — HOSPITAL ENCOUNTER (OUTPATIENT)
Dept: LAB | Age: 44
Discharge: HOME | End: 2019-06-14
Payer: MEDICAID

## 2019-06-14 LAB
ALBUMIN SERPL-MCNC: 3.6 G/DL (ref 3.4–5)
ALBUMIN/GLOB SERPL: 1 {RATIO} (ref 0.8–1.7)
ALP SERPL-CCNC: 53 U/L (ref 45–117)
ALT SERPL-CCNC: 25 U/L (ref 13–56)
ANION GAP SERPL CALC-SCNC: 6 MMOL/L (ref 3–18)
AST SERPL-CCNC: 19 U/L (ref 15–37)
BASOPHILS # BLD: 0 K/UL (ref 0–0.06)
BASOPHILS NFR BLD: 0 % (ref 0–3)
BILIRUB SERPL-MCNC: 0.3 MG/DL (ref 0.2–1)
BUN SERPL-MCNC: 16 MG/DL (ref 7–18)
BUN/CREAT SERPL: 20 (ref 12–20)
CALCIUM SERPL-MCNC: 9.2 MG/DL (ref 8.5–10.1)
CHLORIDE SERPL-SCNC: 108 MMOL/L (ref 100–108)
CHOLEST SERPL-MCNC: 188 MG/DL
CO2 SERPL-SCNC: 26 MMOL/L (ref 21–32)
CREAT SERPL-MCNC: 0.81 MG/DL (ref 0.6–1.3)
DIFFERENTIAL METHOD BLD: ABNORMAL
EOSINOPHIL # BLD: 0.1 K/UL (ref 0–0.4)
EOSINOPHIL NFR BLD: 1 % (ref 0–5)
ERYTHROCYTE [DISTWIDTH] IN BLOOD BY AUTOMATED COUNT: 15.3 % (ref 11.6–14.5)
GLOBULIN SER CALC-MCNC: 3.5 G/DL (ref 2–4)
GLUCOSE SERPL-MCNC: 87 MG/DL (ref 74–99)
HCT VFR BLD AUTO: 34.2 % (ref 35–45)
HDLC SERPL-MCNC: 104 MG/DL (ref 40–60)
HDLC SERPL: 1.8 {RATIO} (ref 0–5)
HGB BLD-MCNC: 11.7 G/DL (ref 12–16)
LDLC SERPL CALC-MCNC: 68 MG/DL (ref 0–100)
LIPID PROFILE,FLP: ABNORMAL
LYMPHOCYTES # BLD: 3.2 K/UL (ref 0.8–3.5)
LYMPHOCYTES NFR BLD: 36 % (ref 20–51)
MCH RBC QN AUTO: 26.5 PG (ref 24–34)
MCHC RBC AUTO-ENTMCNC: 34.2 G/DL (ref 31–37)
MCV RBC AUTO: 77.6 FL (ref 74–97)
MONOCYTES # BLD: 0.5 K/UL (ref 0–1)
MONOCYTES NFR BLD: 6 % (ref 2–9)
NEUTS SEG # BLD: 5.1 K/UL (ref 1.8–8)
NEUTS SEG NFR BLD: 57 % (ref 42–75)
PLATELET # BLD AUTO: 360 K/UL (ref 135–420)
PLATELET COMMENTS,PCOM: ABNORMAL
PMV BLD AUTO: 10.4 FL (ref 9.2–11.8)
POTASSIUM SERPL-SCNC: 4.4 MMOL/L (ref 3.5–5.5)
PROT SERPL-MCNC: 7.1 G/DL (ref 6.4–8.2)
RBC # BLD AUTO: 4.41 M/UL (ref 4.2–5.3)
RBC MORPH BLD: ABNORMAL
SODIUM SERPL-SCNC: 140 MMOL/L (ref 136–145)
TRIGL SERPL-MCNC: 80 MG/DL (ref ?–150)
TSH SERPL DL<=0.05 MIU/L-ACNC: 1.29 UIU/ML (ref 0.36–3.74)
VLDLC SERPL CALC-MCNC: 16 MG/DL
WBC # BLD AUTO: 8.9 K/UL (ref 4.6–13.2)

## 2019-06-14 PROCEDURE — 85025 COMPLETE CBC W/AUTO DIFF WBC: CPT

## 2019-06-14 PROCEDURE — 36415 COLL VENOUS BLD VENIPUNCTURE: CPT

## 2019-06-14 PROCEDURE — 80053 COMPREHEN METABOLIC PANEL: CPT

## 2019-06-14 PROCEDURE — 84443 ASSAY THYROID STIM HORMONE: CPT

## 2019-06-14 PROCEDURE — 80061 LIPID PANEL: CPT

## 2019-09-17 ENCOUNTER — HOSPITAL ENCOUNTER (EMERGENCY)
Age: 44
Discharge: HOME | End: 2019-09-17
Attending: EMERGENCY MEDICINE
Payer: MEDICAID

## 2019-09-17 VITALS
SYSTOLIC BLOOD PRESSURE: 138 MMHG | HEART RATE: 89 BPM | OXYGEN SATURATION: 99 % | BODY MASS INDEX: 25.32 KG/M2 | WEIGHT: 129 LBS | TEMPERATURE: 98.2 F | RESPIRATION RATE: 18 BRPM | HEIGHT: 60 IN | DIASTOLIC BLOOD PRESSURE: 90 MMHG
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
## 2019-09-17 DIAGNOSIS — S76.012A MUSCLE STRAIN OF GLUTEAL REGION, LEFT, INITIAL ENCOUNTER: Primary | ICD-10-CM

## 2019-09-17 PROCEDURE — 99282 EMERGENCY DEPT VISIT SF MDM: CPT

## 2019-09-17 RX ORDER — LISINOPRIL 5 MG/1
5 TABLET ORAL DAILY
COMMUNITY

## 2019-09-17 RX ORDER — CYCLOBENZAPRINE HCL 10 MG
10 TABLET ORAL
Qty: 20 TAB | Refills: 0 | Status: SHIPPED | OUTPATIENT
Start: 2019-09-17 | End: 2019-10-07

## 2019-09-17 NOTE — LETTER
[address] [place] [address] [facility] EMERGENCY DEPT 
[address] [place] [address] [phone] Work/School Note Date: 9/17/2019 To Whom It May concern: 
 
Marvin Rodrigez was seen and treated today in the emergency room by the following provider(s): 
Attending Provider: Ysabel Jean MD 
Physician Assistant: GENET Rodriguez. Marvin Rodrigez may return to work on 9/18/19. Sincerely, MELLISSA Martinez RN
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
## 2019-09-17 NOTE — DISCHARGE INSTRUCTIONS
Patient Education        Hip Flexor Strain: Rehab Exercises  Introduction  Here are some examples of exercises for you to try. The exercises may be suggested for a condition or for rehabilitation. Start each exercise slowly. Ease off the exercises if you start to have pain. You will be told when to start these exercises and which ones will work best for you. How to do the exercises  Pelvic tilt with marching    1. Lie on your back with your knees bent and your feet flat on the floor. 2. Tighten your belly muscles and buttocks, and press your lower back to the floor. 3. Keeping your knees bent, lift and then lower one leg up off the floor, and then lift and lower your other leg like you are marching. Each time you lift your leg, hold that position for about 6 seconds before lowering your leg. 4. Repeat 8 to 12 times. Scissors    1. Lie on your back with your knees bent at a 90-degree angle and your feet off the floor. 2. Tighten your belly muscles and buttocks, and press your lower back to the floor. 3. Slowly straighten one leg, and hold that position for about 6 seconds. Your leg should be about 12 inches off the floor. Bring that leg back to the starting position, and then straighten your other leg. Hold that position for about 6 seconds, and then switch legs again. 4. Repeat 8 to 12 times. Hamstring stretch (lying down)    1. Lie flat on your back with your legs straight. If you feel discomfort in your back, place a small towel roll under your lower back. 2. Holding the back of your affected leg for support, lift your leg straight up and toward your body until you feel a stretch at the back of your thigh. 3. Hold the stretch for at least 30 seconds. 4. Repeat 2 to 4 times. Quadricep and hip flexor stretch (lying on side)    1. Lie on your side with your good leg flat on the floor and your hand supporting your head.   2. Bend your top leg, and reach behind you to grab the front of that foot or ankle with your other hand. 3. Stretch your leg back by pulling your foot toward your buttock. You will feel the stretch in the front of your thigh. If this causes stress on your knee, do not do this stretch. 4. Hold the stretch for at least 15 to 30 seconds. 5. Repeat 2 to 4 times. Hip flexor stretch (kneeling)    1. Kneel on your affected leg and bend your good leg out in front of you, with that foot flat on the floor. If you feel discomfort in the front of your knee, place a towel under your knee. 2. Keeping your back straight, slowly push your hips forward until you feel a stretch in the upper thigh of your back leg and hip. 3. Hold the stretch for at least 15 to 30 seconds. 4. Repeat 2 to 4 times. Hip flexor stretch (edge of table)    1. Lie flat on your back on a table or flat bench, with your knees and lower legs hanging off the edge of the table. 2. Grab your good leg at the knee, and pull that knee back toward your chest. Relax your affected leg and let it hang down toward the floor until you feel a stretch in the upper thigh of your affected leg and hip. 3. Hold the stretch for at least 15 to 30 seconds. 4. Repeat 2 to 4 times. Follow-up care is a key part of your treatment and safety. Be sure to make and go to all appointments, and call your doctor if you are having problems. It's also a good idea to know your test results and keep a list of the medicines you take. Where can you learn more? Go to http://suzanne-simran.info/. Enter X022 in the search box to learn more about \"Hip Flexor Strain: Rehab Exercises. \"  Current as of: September 20, 2018  Content Version: 12.1  © 7798-4859 [employer], [employer]. Care instructions adapted under license by [employer] (which disclaims liability or warranty for this information).  If you have questions about a medical condition or this instruction, always ask your healthcare professional. Araceli Ayala disclaims any warranty or liability for your use of this information.
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
## 2021-06-06 ENCOUNTER — HOSPITAL ENCOUNTER (EMERGENCY)
Age: 46
Discharge: ANOTHER HOSPITAL | End: 2021-06-07
Attending: EMERGENCY MEDICINE
Payer: MEDICAID

## 2021-06-06 ENCOUNTER — APPOINTMENT (OUTPATIENT)
Dept: CT IMAGING | Age: 46
End: 2021-06-06
Attending: STUDENT IN AN ORGANIZED HEALTH CARE EDUCATION/TRAINING PROGRAM
Payer: MEDICAID

## 2021-06-06 ENCOUNTER — APPOINTMENT (OUTPATIENT)
Dept: GENERAL RADIOLOGY | Age: 46
End: 2021-06-06
Attending: STUDENT IN AN ORGANIZED HEALTH CARE EDUCATION/TRAINING PROGRAM
Payer: MEDICAID

## 2021-06-06 VITALS
OXYGEN SATURATION: 98 % | HEART RATE: 81 BPM | BODY MASS INDEX: 25.54 KG/M2 | WEIGHT: 130.07 LBS | RESPIRATION RATE: 17 BRPM | TEMPERATURE: 98.5 F | SYSTOLIC BLOOD PRESSURE: 136 MMHG | DIASTOLIC BLOOD PRESSURE: 80 MMHG | HEIGHT: 60 IN

## 2021-06-06 DIAGNOSIS — S82.142A CLOSED FRACTURE OF LEFT TIBIAL PLATEAU, INITIAL ENCOUNTER: Primary | ICD-10-CM

## 2021-06-06 PROCEDURE — 74011250636 HC RX REV CODE- 250/636: Performed by: EMERGENCY MEDICINE

## 2021-06-06 PROCEDURE — 96376 TX/PRO/DX INJ SAME DRUG ADON: CPT

## 2021-06-06 PROCEDURE — 74011250637 HC RX REV CODE- 250/637: Performed by: STUDENT IN AN ORGANIZED HEALTH CARE EDUCATION/TRAINING PROGRAM

## 2021-06-06 PROCEDURE — 73700 CT LOWER EXTREMITY W/O DYE: CPT

## 2021-06-06 PROCEDURE — 73590 X-RAY EXAM OF LOWER LEG: CPT

## 2021-06-06 PROCEDURE — 96374 THER/PROPH/DIAG INJ IV PUSH: CPT

## 2021-06-06 PROCEDURE — 96375 TX/PRO/DX INJ NEW DRUG ADDON: CPT

## 2021-06-06 PROCEDURE — 73562 X-RAY EXAM OF KNEE 3: CPT

## 2021-06-06 PROCEDURE — 73610 X-RAY EXAM OF ANKLE: CPT

## 2021-06-06 PROCEDURE — 74011250636 HC RX REV CODE- 250/636: Performed by: STUDENT IN AN ORGANIZED HEALTH CARE EDUCATION/TRAINING PROGRAM

## 2021-06-06 PROCEDURE — 99285 EMERGENCY DEPT VISIT HI MDM: CPT

## 2021-06-06 RX ORDER — HYDROMORPHONE HYDROCHLORIDE 1 MG/ML
0.5 INJECTION, SOLUTION INTRAMUSCULAR; INTRAVENOUS; SUBCUTANEOUS
Status: COMPLETED | OUTPATIENT
Start: 2021-06-06 | End: 2021-06-07

## 2021-06-06 RX ORDER — MORPHINE SULFATE 2 MG/ML
2 INJECTION, SOLUTION INTRAMUSCULAR; INTRAVENOUS
Status: COMPLETED | OUTPATIENT
Start: 2021-06-06 | End: 2021-06-06

## 2021-06-06 RX ORDER — MORPHINE SULFATE 4 MG/ML
4 INJECTION INTRAVENOUS ONCE
Status: COMPLETED | OUTPATIENT
Start: 2021-06-06 | End: 2021-06-06

## 2021-06-06 RX ORDER — ONDANSETRON 2 MG/ML
4 INJECTION INTRAMUSCULAR; INTRAVENOUS ONCE
Status: COMPLETED | OUTPATIENT
Start: 2021-06-06 | End: 2021-06-06

## 2021-06-06 RX ORDER — HYDROMORPHONE HYDROCHLORIDE 1 MG/ML
1 INJECTION, SOLUTION INTRAMUSCULAR; INTRAVENOUS; SUBCUTANEOUS ONCE
Status: COMPLETED | OUTPATIENT
Start: 2021-06-06 | End: 2021-06-06

## 2021-06-06 RX ORDER — KETOROLAC TROMETHAMINE 15 MG/ML
15 INJECTION, SOLUTION INTRAMUSCULAR; INTRAVENOUS
Status: COMPLETED | OUTPATIENT
Start: 2021-06-06 | End: 2021-06-06

## 2021-06-06 RX ORDER — ACETAMINOPHEN 500 MG
1000 TABLET ORAL ONCE
Status: COMPLETED | OUTPATIENT
Start: 2021-06-06 | End: 2021-06-06

## 2021-06-06 RX ORDER — AMLODIPINE BESYLATE 2.5 MG/1
2.5 TABLET ORAL DAILY
COMMUNITY

## 2021-06-06 RX ORDER — DIPHENHYDRAMINE HYDROCHLORIDE 50 MG/ML
25 INJECTION, SOLUTION INTRAMUSCULAR; INTRAVENOUS
Status: COMPLETED | OUTPATIENT
Start: 2021-06-06 | End: 2021-06-06

## 2021-06-06 RX ADMIN — DIPHENHYDRAMINE HYDROCHLORIDE 25 MG: 50 INJECTION INTRAMUSCULAR; INTRAVENOUS at 20:18

## 2021-06-06 RX ADMIN — MORPHINE SULFATE 4 MG: 4 INJECTION, SOLUTION INTRAMUSCULAR; INTRAVENOUS at 15:11

## 2021-06-06 RX ADMIN — HYDROMORPHONE HYDROCHLORIDE 1 MG: 1 INJECTION, SOLUTION INTRAMUSCULAR; INTRAVENOUS; SUBCUTANEOUS at 20:18

## 2021-06-06 RX ADMIN — MORPHINE SULFATE 2 MG: 2 INJECTION, SOLUTION INTRAMUSCULAR; INTRAVENOUS at 19:14

## 2021-06-06 RX ADMIN — ONDANSETRON 4 MG: 2 INJECTION INTRAMUSCULAR; INTRAVENOUS at 15:12

## 2021-06-06 RX ADMIN — MORPHINE SULFATE 2 MG: 2 INJECTION, SOLUTION INTRAMUSCULAR; INTRAVENOUS at 16:14

## 2021-06-06 RX ADMIN — KETOROLAC TROMETHAMINE 15 MG: 15 INJECTION, SOLUTION INTRAMUSCULAR; INTRAVENOUS at 19:13

## 2021-06-06 RX ADMIN — ACETAMINOPHEN 1000 MG: 500 TABLET ORAL at 16:15

## 2021-06-06 NOTE — ED PROVIDER NOTES
Knee Injury  This is a new problem. The current episode started less than 1 hour ago. The problem occurs constantly. The problem has not changed since onset. Pertinent negatives include no chest pain, no abdominal pain, no headaches and no shortness of breath. The symptoms are aggravated by walking, twisting and bending. Nothing relieves the symptoms. She has tried nothing for the symptoms. Past Medical History:   Diagnosis Date    Asthma     Sickle cell trait ([address]        No past surgical history on file. Family History:   Problem Relation Age of Onset    Diabetes Mother     Hypertension Mother     Diabetes Father     Hypertension Father     Stroke Father        Social History     Socioeconomic History    Marital status:      Spouse name: Not on file    Number of children: Not on file    Years of education: Not on file    Highest education level: Not on file   Occupational History    Occupation: retail     Employer: [employer]   Tobacco Use    Smoking status: Current Every Day Smoker     Packs/day: 0.25     Years: 15.00     Pack years: 3.75    Smokeless tobacco: Never Used   Substance and Sexual Activity    Alcohol use: Yes     Alcohol/week: 1.7 - 2.5 standard drinks     Types: 2 - 3 Cans of beer per week     Comment: occassionally    Drug use: No     Types: Marijuana    Sexual activity: Yes     Partners: Male     Birth control/protection: Condom   Other Topics Concern    Not on file   Social History Narrative    Not on file     Social Determinants of Health     Financial Resource Strain:     Difficulty of Paying Living Expenses:    Food Insecurity:     Worried About Running Out of Food in the Last Year:     Ran Out of Food in the Last Year:    Transportation Needs:     Lack of Transportation (Medical):      Lack of Transportation (Non-Medical):    Physical Activity:     Days of Exercise per Week:     Minutes of Exercise per Session:    Stress:     Feeling of Stress : Social Connections:     Frequency of Communication with Friends and Family:     Frequency of Social Gatherings with Friends and Family:     Attends Judaism Services:     Active Member of Clubs or Organizations:     Attends Club or Organization Meetings:     Marital Status:    Intimate Partner Violence:     Fear of Current or Ex-Partner:     Emotionally Abused:     Physically Abused:     Sexually Abused: ALLERGIES: Patient has no known allergies. Review of Systems   Constitutional: Negative for chills and fever. HENT: Negative for rhinorrhea and sore throat. Eyes: Negative for visual disturbance. Respiratory: Negative for cough and shortness of breath. Cardiovascular: Negative for chest pain. Gastrointestinal: Negative for abdominal pain and nausea. Endocrine: Negative for polyuria. Genitourinary: Negative for dysuria and hematuria. Musculoskeletal: Positive for arthralgias and gait problem. Negative for back pain and neck pain. Skin: Negative for rash. Neurological: Negative for syncope and headaches. Psychiatric/Behavioral: Negative for suicidal ideas. Vitals:    06/06/21 1456 06/06/21 2149   BP: 134/89 (!) 141/82   Pulse: 98 74   Resp: 20    Temp: 99 °F (37.2 °C) 98.7 °F (37.1 °C)   SpO2: 100% 97%   Weight: 59 kg (130 lb 1.1 oz)    Height: 5' (1.524 m)             Physical Exam  Vitals and nursing note reviewed. Constitutional:       Appearance: Normal appearance. HENT:      Head: Normocephalic and atraumatic. Mouth/Throat:      Mouth: Mucous membranes are moist.      Pharynx: Oropharynx is clear. Eyes:      Conjunctiva/sclera: Conjunctivae normal.   Cardiovascular:      Rate and Rhythm: Normal rate and regular rhythm. Pulses: Normal pulses. Heart sounds: Normal heart sounds. Pulmonary:      Effort: Pulmonary effort is normal.      Breath sounds: Normal breath sounds. Abdominal:      Palpations: Abdomen is soft. Tenderness:  There is no abdominal tenderness. Musculoskeletal:      Cervical back: Normal range of motion. Right knee: Normal.      Left knee: Swelling present. Decreased range of motion. Tenderness present over the lateral joint line. Right lower leg: Normal.      Left lower leg: Tenderness present. Comments: L ankle   Skin:     General: Skin is warm and dry. Neurological:      General: No focal deficit present. Mental Status: She is alert and oriented to person, place, and time. MEDICATIONS:    Medications   HYDROmorphone (DILAUDID) syringe 0.5 mg (has no administration in time range)   morphine injection 4 mg (4 mg IntraVENous Given 6/6/21 1511)   ondansetron (ZOFRAN) injection 4 mg (4 mg IntraVENous Given 6/6/21 1512)   acetaminophen (TYLENOL) tablet 1,000 mg (1,000 mg Oral Given 6/6/21 1615)   morphine injection 2 mg (2 mg IntraVENous Given 6/6/21 1614)   morphine injection 2 mg (2 mg IntraVENous Given 6/6/21 1914)   ketorolac (TORADOL) injection 15 mg (15 mg IntraVENous Given 6/6/21 1913)   HYDROmorphone (DILAUDID) injection 1 mg (1 mg IntraVENous Given 6/6/21 2018)   diphenhydrAMINE (BENADRYL) injection 25 mg (25 mg IntraVENous Given 6/6/21 2018)            RESULTS:        CT KNEE LT WO CONT   Final Result      Mildly comminuted depressed and angulated fracture of the lateral tibial   plateau. Hemarthrosis. Report provided to the emergency department at 2148 hrs. .            XR ANKLE LT MIN 3 V   Final Result      Negative ankle. XR TIB/FIB LT   Final Result      Negative tib/fib Exclusive of the knee. See dedicated report of left knee. XR KNEE LT 3 V   Final Result      Mildly depressed lateral tibial plateau fracture with a large effusion. .              Abnormal Results this visit:  Labs Reviewed - No data to display    All Results past 24 hours:  No results found for this or any previous visit (from the past 24 hour(s)).         CLINICAL IMPRESSION    No diagnosis found.        MDM  Number of Diagnoses or Management Options  Diagnosis management comments: 45F PMHx HTN presents for Knee pain after traumatic injury. Direct blow at softball game as person slide into base striking her L shin. Immediate knee pain. Non-ambulatory after accident. EMS transport to ED. On arrival tachycardic and in a lot of pain. Once pain controlled exam was notable for left Knee TTP over anterior and lateral joint lines w/ associated swelling. Distal PT/DP pulses intact. Distal sensation to light touch and distal ROM in ankle and toes intact. Plain films revealing of tibial plateau fracture. CT of L knee w/ depressed comminuted tibial plateau fracture. Ortho consulted and recommended transfer to trauma orthopedics versus discharge and follow-up. Transfer center notified and several facilities reached out to. Initial discussion with [facility] Armaan Herrera declined to acept patient. [place] reached out to. Patient turned over to Dr. Juanita Hinds pending acceptance to outside facility. Patient hemodynamically stable requiring PRN pain control. Patient counseled on findings, treatments, and transfer plan. Patient stated understanding and agreement with plan.        Amount and/or Complexity of Data Reviewed  Tests in the radiology section of CPT®: reviewed           Procedures

## 2021-06-06 NOTE — LETTER
[facility] EMERGENCY DEPT 
[address] [facility] [id number] [phone] Work/School Note Date: 6/6/2021 To Whom It May concern: 
 
Celeste Bean was seen and treated today in the emergency room by the following provider(s): 
Attending Provider: Carlo Holstein, MD. Celeste Bean to return to work to be decided by [employer] Sincerely, Javy Ocampo RN

## 2021-06-07 PROCEDURE — 74011250636 HC RX REV CODE- 250/636: Performed by: STUDENT IN AN ORGANIZED HEALTH CARE EDUCATION/TRAINING PROGRAM

## 2021-06-07 PROCEDURE — 96376 TX/PRO/DX INJ SAME DRUG ADON: CPT

## 2021-06-07 RX ADMIN — HYDROMORPHONE HYDROCHLORIDE 0.5 MG: 1 INJECTION, SOLUTION INTRAMUSCULAR; INTRAVENOUS; SUBCUTANEOUS at 00:05

## 2021-06-07 NOTE — ED NOTES
Patient seen by Dr. Lucia Gonzales has left tibial plateau fracture has been accepted by Dr. Cece Morales at [facility] for transfer. This with the patient. She did want to go to [facility] but Maria G referred us to [place] refused the transfer. Discussed the options of potentially going home however with her not having an orthopedic in place I think it is better to be transferred especially with her injury. Murray Maurice

## 2021-08-11 ENCOUNTER — HOSPITAL ENCOUNTER (OUTPATIENT)
Dept: PHYSICAL THERAPY | Age: 46
Discharge: HOME | End: 2021-08-11
Payer: MEDICAID

## 2021-08-11 PROCEDURE — 97162 PT EVAL MOD COMPLEX 30 MIN: CPT

## 2021-08-11 NOTE — PROGRESS NOTES
PT DAILY TREATMENT NOTE     Patient Name: Karina Hennessy  Date:2021  : 1975  [x]  Patient  Verified  Payor: [address] / Plan: [facility] / Product Type: Managed Care Medicaid /    In time:11:45  Out time:12:30  Total Treatment Time (min): 45  Visit #: 1 of 18    Treatment Area: Left leg pain [M79.605]    SUBJECTIVE  Pain Level (0-10 scale): 2/10  Any medication changes, allergies to medications, adverse drug reactions, diagnosis change, or new procedure performed?: [x] No    [] Yes (see summary sheet for update)  Subjective functional status/changes:   [] No changes reported  See IE    OBJECTIVE    Modality rationale: decrease edema, decrease inflammation and decrease pain to improve the patients ability to improve ADL ease. Min Type Additional Details   8 [x]  Ice     []  heat  []  Ice massage  []  Laser   []  Anodyne Position: supine  Location: left knee   [] Skin assessment post-treatment:  []intact []redness- no adverse reaction    []redness  adverse reaction:     17 min [x]Eval                  []Re-Eval       10 min Therapeutic Exercise:  [x] See flow sheet :   Rationale: increase ROM and increase strength to improve the patients ability to improve ADL ease. 10 min Manual Therapy:  Left tibiofemoral extension mobs grade II, flexion mobs grade II with the patient in supine. Left patellofemoral mobs grade I all planes. The manual therapy interventions were performed at a separate and distinct time from the therapeutic activities interventions. Rationale: decrease pain, increase ROM and increase tissue extensibility to improve ADl ease.              With   [] TE   [] TA   [] neuro   [] other: Patient Education: [x] Review HEP    [] Progressed/Changed HEP based on:   [] positioning   [] body mechanics   [] transfers   [] heat/ice application    [] other:      Other Objective/Functional Measures: See IE     Pain Level (0-10 scale) post treatment: 2/10    ASSESSMENT/Changes in Function: See POC    Patient will continue to benefit from skilled PT services to modify and progress therapeutic interventions, address functional mobility deficits, address ROM deficits, address strength deficits, analyze and address soft tissue restrictions, analyze and cue movement patterns, analyze and modify body mechanics/ergonomics, assess and modify postural abnormalities, address imbalance/dizziness and instruct in home and community integration to attain remaining goals. [x]  See Plan of Care  []  See progress note/recertification  []  See Discharge Summary         Progress towards goals / Updated goals:  Short Term Goals: To be accomplished in 2 weeks:               1. The patient will demonstrate independence and compliance with HEP to maximize therapeutic benefit. IE: issued HEP               2. The patient will improve left knee extension to 0 degrees passively to improve ease of ambulation efficiency. IE: lacking 24 degrees actively  Long Term Goals: To be accomplished in 4 weeks:               1. The patient will improve FOTO score to 55 to improve ease of ADLs. IE: 52               2. The patient will improve left knee flexion to 90 degrees to improve ease of transfers. IE 40/50 degrees active/passive. 3. The patient will demonstrate effective quad setting in order to progress stability in prep for WB. IE: poor quad set               4. The patient will demonstrate active left knee extension to 0 degrees to improve ease of ambulation efficiency.     IE: lacking 24 degrees actively     PLAN  []  Upgrade activities as tolerated     [x]  Continue plan of care  []  Update interventions per flow sheet       []  Discharge due to:_  []  Other:_      Mo Kat, PT 8/11/2021  3:25 PM    Future Appointments   Date Time Provider Sol Ulrich   8/13/2021  9:15 AM Douglas Garcia PTA [facility]   8/16/2021  4:45 PM Leonela Charles, PT [facility]   8/18/2021  8:45 AM Alejandro Overton, Cherl Burkitt, PT [facility]   8/20/2021 11:30 AM Darrall Lowndes, PT [facility]   8/23/2021  1:30 PM Leanne Estrada, PTA [facility]   8/25/2021 12:45 PM Char Diazt, PTA [facility]   8/27/2021 12:45 PM Char Garcia, PTA [facility]   8/30/2021 12:00 PM Leanne Estrada, PTA [facility]   9/1/2021 12:00 PM Char Jose, PTA [facility]   9/3/2021 11:30 AM Darrall Ginger, PT [facility]   10/4/2021 12:00 PM Darrall Lowndes, PT [facility]   10/6/2021 12:00 PM Charalphonso Diazt, PTA [facility]   10/8/2021 12:00 PM Char Garcia, PTA [facility]

## 2021-08-11 NOTE — PROGRESS NOTES
[facility]  [address]  [place], [address]  [phone] fax    Plan of Care/ Statement of Necessity for Physical Therapy Services    Patient name: Lexx Rocha Start of Care: 2021   Referral source: Brionna [place], [place] : 1975    Medical Diagnosis: Left leg pain [M79.605]  Payor: [facility] MEDICAID / Plan: [address] [place] [address] / Product Type: Managed Care Medicaid /  Onset Date:2021    Treatment Diagnosis: Left knee pain   Prior Hospitalization: see medical history Provider#: [id number]   Medications: Verified on Patient summary List    Comorbidities: HTN, asthma, left knee ORIF   Prior Level of Function: The patient was ambulatory independently. The Plan of Care and following information is based on the information from the initial evaluation. Assessment/ key information: The patient is a 50year old female that suffered from a fracture left tibial plateau when playing kickball, and another player ran into her leg. She did have an ORIF procedure performed on 2021, then again had an ORIF performed on 2021. The patient has had weight bearing precautions including TTWB as well as range of motion from 0 - 50, but allowing her to increase 10 degrees every week, though it is questionable when this order was issued, at her last follow-up or on date of eval when the order was faxed. The patient did require cuing in order to weight bear correctly, as she was bearing more weight that TTWB, but quickly did perform correctly. The patient has impairments related to the procedure consisting of pain, decreased ROM, decreased flexibility, decreased strength, limited ADL ease, and decreased ambulation efficiency. The patient will benefit from skilled PT in order to address the aforementioned impairments.      Evaluation Complexity History MEDIUM  Complexity : 1-2 comorbidities / personal factors will impact the outcome/ POC ; Examination MEDIUM Complexity : 3 Standardized tests and measures addressing body structure, function, activity limitation and / or participation in recreation  ;Presentation MEDIUM Complexity : Evolving with changing characteristics  ; Clinical Decision Making MEDIUM Complexity : FOTO score of 26-74  Overall Complexity Rating: MEDIUM  Problem List: pain affecting function, decrease ROM, decrease strength, edema affecting function, impaired gait/ balance, decrease ADL/ functional abilitiies, decrease activity tolerance, decrease flexibility/ joint mobility and decrease transfer abilities   Treatment Plan may include any combination of the following: Therapeutic exercise, Therapeutic activities, Neuromuscular re-education, Physical agent/modality, Manual therapy, Patient education, Self Care training, Functional mobility training, Home safety training and Stair training  Patient / Family readiness to learn indicated by: asking questions, trying to perform skills and interest  Persons(s) to be included in education: patient (P)  Barriers to Learning/Limitations: None  Patient Goal (s): Be back walking correctly get dexter of equipment major recovery.   Patient Self Reported Health Status: excellent  Rehabilitation Potential: good    Short Term Goals: To be accomplished in 2 weeks:   1. The patient will demonstrate independence and compliance with HEP to maximize therapeutic benefit. 2. The patient will improve left knee extension to 0 degrees passively to improve ease of ambulation efficiency. Long Term Goals: To be accomplished in 4 weeks:   1. The patient will improve FOTO score to 55 to improve ease of ADLs. 2. The patient will improve left knee flexion to 90 degrees to improve ease of transfers. 3. The patient will demonstrate effective quad setting in order to progress stability in prep for WB. 4. The patient will demonstrate active left knee extension to 0 degrees to improve ease of ambulation efficiency. Frequency / Duration: Patient to be seen 3 times per week for 6 weeks. Patient/ Caregiver education and instruction: Diagnosis, prognosis, self care, activity modification, brace/ splint application and exercises   [x]  Plan of care has been reviewed with NACHO Miramontes, PT 8/11/2021 3:18 PM    ________________________________________________________________________    I certify that the above Therapy Services are being furnished while the patient is under my care. I agree with the treatment plan and certify that this therapy is necessary.     [de-identified] Signature:____________Date:_________TIME:________     GENET Lockett  ** Signature, Date and Time must be completed for valid certification **    Please sign and return to [facility] [address]  [address] [facility] [address] [place] [address], [address]  [phone] fax

## 2021-08-13 ENCOUNTER — APPOINTMENT (OUTPATIENT)
Dept: PHYSICAL THERAPY | Age: 46
End: 2021-08-13
Payer: MEDICAID

## 2021-08-16 ENCOUNTER — HOSPITAL ENCOUNTER (OUTPATIENT)
Dept: PHYSICAL THERAPY | Age: 46
Discharge: HOME | End: 2021-08-16
Payer: MEDICAID

## 2021-08-16 PROCEDURE — 97110 THERAPEUTIC EXERCISES: CPT

## 2021-08-16 PROCEDURE — 97112 NEUROMUSCULAR REEDUCATION: CPT

## 2021-08-16 PROCEDURE — 97032 APPL MODALITY 1+ESTIM EA 15: CPT

## 2021-08-16 PROCEDURE — 97530 THERAPEUTIC ACTIVITIES: CPT

## 2021-08-16 NOTE — PROGRESS NOTES
PT DAILY TREATMENT NOTE     Patient Name: Kerry Callaway  Date:2021  : 1975  [x]  Patient  Verified  Payor: [address] [facility] [facility] [address] / Plan: [address] [facility] / Product Type: Managed Care Medicaid /    In time:12:04  Out time:1:14  Total Treatment Time (min): 70  Visit #: 2 of 18    Treatment Area: Left leg pain [M79.605]    SUBJECTIVE  Pain Level (0-10 scale): 7-8  Any medication changes, allergies to medications, adverse drug reactions, diagnosis change, or new procedure performed?: [x] No    [] Yes (see summary sheet for update)  Subjective functional status/changes:   [] No changes reported  Pt reports she was able to bend her left knee more this morning but it has tightened up since then. OBJECTIVE    Modality rationale: decrease edema, decrease inflammation and decrease pain to improve the patients ability to perform daily task with ease.    Min Type Additional Details    [] Estim:  []Unatt       []IFC  []Premod                        []Other:  []w/ice   []w/heat  Position:  Location:   8+2 [x] Estim: []Att    []TENS instruct  []NMES                    [x]Other: [place] []w/US   []w/ice   []w/heat  Position: long sit  Location: left knee    []  Traction: [] Cervical       []Lumbar                       [] Prone          []Supine                       []Intermittent   []Continuous Lbs:  [] before manual  [] after manual    []  Ultrasound: []Continuous   [] Pulsed                           []1MHz   []3MHz W/cm2:  Location:    []  Iontophoresis with dexamethasone         Location: [] Take home patch   [] In clinic    []  Ice     []  heat  []  Ice massage  []  Laser   []  Anodyne Position:  Location:    []  Laser with stim  []  Other:  Position:  Location:   10 [x]  Vasopneumatic Device    []  Right     [x]  Left  Pre-treatment girth:  Post-treatment girth:  Measured at (location):  Pressure:       [x] lo [] med [] hi   Temperature: [x] lo [] med [] hi   [] Skin assessment post-treatment:  [x]intact []redness- no adverse reaction    []redness  adverse reaction:       14 min Therapeutic Exercise:  [x] See flow sheet :   Rationale: increase ROM and increase strength to improve the patients ability to perform functional task with ease. 10 min Therapeutic Activity:  [x]  See flow sheet :   Rationale: increase strength and improve coordination  to improve the patients ability to perform daily task with ease. 18 min Neuromuscular Re-education:  [x]  See flow sheet :   Rationale: increase strength, improve coordination, improve balance and increase proprioception  to improve the patients ability to perform ADL's with ease. 8 min Manual Therapy:  Left tibiofemoral extension mobs grade II, flexion mobs grade II with the patient in supine.     Left patellofemoral mobs grade I all planes. The manual therapy interventions were performed at a separate and distinct time from the therapeutic activities interventions. Rationale: decrease pain, increase ROM and increase tissue extensibility to improve functional mobility with ambulation ADL's. With   [] TE   [] TA   [] neuro   [] other: Patient Education: [x] Review HEP    [] Progressed/Changed HEP based on:   [] positioning   [] body mechanics   [] transfers   [] heat/ice application    [] other:      Other Objective/Functional Measures: initiated therex per flowsheet     Pain Level (0-10 scale) post treatment: 2    ASSESSMENT/Changes in Function:   First f/u session with pt putting forth a good effort with therex and displaying a fair tolerance to exercises. Pt displays fair ROM noted with manual that improved as we progressed through manual treatment (within 0-60 deg). Pt displays moderated anxiety with [place] E-stim treatment which kept intensity levels low this visit however pt verbally reports she may be able to stand more NV. Decreased pain post treatment.     Patient will continue to benefit from skilled PT services to modify and progress therapeutic interventions, address functional mobility deficits, address ROM deficits, address strength deficits, analyze and address soft tissue restrictions, analyze and cue movement patterns, analyze and modify body mechanics/ergonomics and assess and modify postural abnormalities to attain remaining goals. [x]  See Plan of Care  []  See progress note/recertification  []  See Discharge Summary         Progress towards goals / Updated goals:  Short Term Goals: To be accomplished in 2 weeks:               1. The patient will demonstrate independence and compliance with HEP to maximize therapeutic benefit. IE: issued HEP   Current: Met 8/16/21 Pt reports understanding and compliance.               2. The patient will improve left knee extension to 0 degrees passively to improve ease of ambulation efficiency. IE: lacking 24 degrees actively  [address] be accomplished in 4 weeks:               1. The patient will improve FOTO score to 55 to improve ease of ADLs. IE: 52               2. The patient will improve left knee flexion to 90 degrees to improve ease of transfers. IE 40/50 degrees active/passive.               3. The patient will demonstrate effective quad setting in order to progress stability in prep for WB. IE: poor quad set               4. The patient will demonstrate active left knee extension to 0 degrees to improve ease of ambulation efficiency.                IE: lacking 24 degrees actively    PLAN  []  Upgrade activities as tolerated     [x]  Continue plan of care  []  Update interventions per flow sheet       []  Discharge due to:_  []  Other:_      Chidi Manrique PTA 8/16/2021  12:05 PM    Future Appointments   Date Time Provider Sol Ulrich   8/18/2021  8:45 AM [facility]   8/20/2021 11:30 AM [facility] Caballero, PT [facility]   8/23/2021  1:30 PM Sofy Mike PTA [facility]   8/25/2021 12:45 PM Cory Bello, PTA [facility]   8/27/2021 12:45 PM Cory Bello, PTA [facility]   8/30/2021 12:00 PM Matteo Coppola, PTA [facility]   9/1/2021 12:00 PM Cory Bello, PTA [facility]   9/3/2021 11:30 AM James Goddard, PT [facility]

## 2021-08-18 ENCOUNTER — HOSPITAL ENCOUNTER (OUTPATIENT)
Dept: PHYSICAL THERAPY | Age: 46
Discharge: HOME | End: 2021-08-18
Payer: MEDICAID

## 2021-08-18 PROCEDURE — 97032 APPL MODALITY 1+ESTIM EA 15: CPT

## 2021-08-18 PROCEDURE — 97112 NEUROMUSCULAR REEDUCATION: CPT

## 2021-08-18 PROCEDURE — 97110 THERAPEUTIC EXERCISES: CPT

## 2021-08-18 NOTE — PROGRESS NOTES
PT DAILY TREATMENT NOTE     Patient Name: Carnella Siemens  Date:2021  : 1975  [x]  Patient  Verified  Payor: [address] / Plan: [address] [facility] / Product Type: Managed Care Medicaid /    In time:11:17  Out time:12:17  Total Treatment Time (min): 60  Visit #: 3 of 18    Treatment Area: Left leg pain [M79.605]    SUBJECTIVE  Pain Level (0-10 scale): 0  Any medication changes, allergies to medications, adverse drug reactions, diagnosis change, or new procedure performed?: [x] No    [] Yes (see summary sheet for update)  Subjective functional status/changes:   [] No changes reported  Pt reports no pain coming in today and that she did not feel to bad after her first f/u session. OBJECTIVE    Modality rationale: decrease edema, decrease inflammation and decrease pain to improve the patients ability to perform daily task with ease.    Min Type Additional Details    [] Estim:  []Unatt       []IFC  []Premod                        []Other:  []w/ice   []w/heat  Position:  Location:   8+2 [x] Estim: []Att    []TENS instruct  []NMES                    [x]Other: [place] []w/US   []w/ice   []w/heat  Position:long sit  Location: left knee    []  Traction: [] Cervical       []Lumbar                       [] Prone          []Supine                       []Intermittent   []Continuous Lbs:  [] before manual  [] after manual    []  Ultrasound: []Continuous   [] Pulsed                           []1MHz   []3MHz W/cm2:  Location:    []  Iontophoresis with dexamethasone         Location: [] Take home patch   [] In clinic    []  Ice     []  heat  []  Ice massage  []  Laser   []  Anodyne Position:  Location:    []  Laser with stim  []  Other:  Position:  Location:   10 [x]  Vasopneumatic Device    []  Right     [x]  Left  Pre-treatment girth:  Post-treatment girth:  Measured at (location):  Pressure:       [x] lo [] med [] hi   Temperature: [x] lo [] med [] hi   [x] Skin assessment post-treatment: [x]intact []redness- no adverse reaction    []redness  adverse reaction:       16 min Therapeutic Exercise:  [x] See flow sheet :   Rationale: increase ROM and increase strength to improve the patients ability to perform functional task with ease. 18 min Neuromuscular Re-education:  [x]  See flow sheet :   Rationale: increase strength, improve coordination, improve balance and increase proprioception  to improve the patients ability to perform ADL's with ease. 8 min Manual Therapy:   Left tibiofemoral extension mobs grade II, flexion mobs grade II with the patient in supine.     Left patellofemoral mobs grade I all planes.    The manual therapy interventions were performed at a separate and distinct time from the therapeutic activities interventions. Rationale: decrease pain, increase ROM, increase tissue extensibility and decrease edema  to improve functional mobility with ambulation ADL's. With   [] TE   [] TA   [] neuro   [] other: Patient Education: [x] Review HEP    [] Progressed/Changed HEP based on:   [] positioning   [] body mechanics   [] transfers   [] heat/ice application    [] other:      Other Objective/Functional Measures:      Pain Level (0-10 scale) post treatment: 0    ASSESSMENT/Changes in Function:   Pt performed exercises as prescribed noting no increasing pain in the left knee but noting continued tightness. Improved flexion mobility noted following manual. Pt reports no pain post treatment. Patient will continue to benefit from skilled PT services to modify and progress therapeutic interventions, address functional mobility deficits, address ROM deficits, address strength deficits, analyze and address soft tissue restrictions, analyze and cue movement patterns, analyze and modify body mechanics/ergonomics and assess and modify postural abnormalities to attain remaining goals.      [x]  See Plan of Care  []  See progress note/recertification  []  See Discharge Summary Progress towards goals / Updated goals:  Short Term Goals: To be accomplished in 2 weeks:               1. The patient will demonstrate independence and compliance with HEP to maximize therapeutic benefit.              IE: issued HEP              Current: Met 8/16/21 Pt reports understanding and compliance.               2. The patient will improve left knee extension to 0 degrees passively to improve ease of ambulation efficiency.             OD: lacking 24 degrees actively  [address] be accomplished in 4 weeks:               1. The patient will improve FOTO score to 55 to improve ease of ADLs.             VA: 40               7. The patient will improve left knee flexion to 90 degrees to improve ease of transfers.              IE 40/50 degrees active/passive.               3. The patient will demonstrate effective quad setting in order to progress stability in prep for WB.              IE: poor quad set               4. The patient will demonstrate active left knee extension to 0 degrees to improve ease of ambulation efficiency.                IE: lacking 24 degrees actively    PLAN  []  Upgrade activities as tolerated     [x]  Continue plan of care  []  Update interventions per flow sheet       []  Discharge due to:_  []  Other:_      Brody Benitez, NACHO 8/18/2021  11:21 AM    Future Appointments   Date Time Provider Sol Ulrich   8/20/2021 11:15 AM Stewart Hanna, PT [facility]   8/23/2021  1:30 PM Pilo Leyva, PTA [facility]   8/25/2021 12:45 PM Paola Mortimer, PTA [facility]   8/27/2021 12:45 PM Paola Mortimer, PTA [facility]   8/30/2021 12:00 PM Pilo Leyva, PTA [facility]   9/1/2021 12:00 PM Paola Mortimer, PTA [facility]   9/3/2021 11:30 AM Lalo Goddard, PT [facility]

## 2021-08-20 ENCOUNTER — APPOINTMENT (OUTPATIENT)
Dept: PHYSICAL THERAPY | Age: 46
End: 2021-08-20
Payer: MEDICAID

## 2021-08-23 ENCOUNTER — APPOINTMENT (OUTPATIENT)
Dept: PHYSICAL THERAPY | Age: 46
End: 2021-08-23
Payer: MEDICAID

## 2021-08-24 ENCOUNTER — HOSPITAL ENCOUNTER (OUTPATIENT)
Dept: PHYSICAL THERAPY | Age: 46
Discharge: HOME | End: 2021-08-24
Payer: MEDICAID

## 2021-08-24 PROCEDURE — 97110 THERAPEUTIC EXERCISES: CPT

## 2021-08-24 PROCEDURE — 97014 ELECTRIC STIMULATION THERAPY: CPT

## 2021-08-24 PROCEDURE — 97016 VASOPNEUMATIC DEVICE THERAPY: CPT

## 2021-08-24 NOTE — PROGRESS NOTES
PT DAILY TREATMENT NOTE     Patient Name: Fransisca Gifford  Date:2021  : 1975  [x]  Patient  Verified  Payor: Yaquelin Palacios / Plan: [address] [place] [address] / Product Type: Managed Care Medicaid /    In time:12:01  Out time:1:08  Total Treatment Time (min): 79  Visit #: 4 of 18    Treatment Area: Left leg pain [M79.605]    SUBJECTIVE  Pain Level (0-10 scale): 8/10  Any medication changes, allergies to medications, adverse drug reactions, diagnosis change, or new procedure performed?: [x] No    [] Yes (see summary sheet for update)  Subjective functional status/changes:   [] No changes reported  \"It's really sore today. \"     OBJECTIVE    Modality rationale: decrease inflammation and decrease pain to improve the patients ability to perform ADL's.    Min Type Additional Details    [] Estim:  []Unatt       []IFC  []Premod                        []Other:  []w/ice   []w/heat  Position:  Location:   10+2 setup [x] Estim: []Att    []TENS instruct  []NMES                    [x]Other: [place] 10\"on:10\"off []w/US   []w/ice   []w/heat  Position: Reclined  Location: Left quads    []  Traction: [] Cervical       []Lumbar                       [] Prone          []Supine                       []Intermittent   []Continuous Lbs:  [] before manual  [] after manual    []  Ultrasound: []Continuous   [] Pulsed                           []1MHz   []3MHz W/cm2:  Location:    []  Iontophoresis with dexamethasone         Location: [] Take home patch   [] In clinic    []  Ice     []  heat  []  Ice massage  []  Laser   []  Anodyne Position:  Location:    []  Laser with stim  []  Other:  Position:  Location:   10 [x]  Vasopneumatic Device    []  Right     [x]  Left  Pre-treatment girth:  Post-treatment girth:  Measured at (location):  Pressure:       [x] lo [] med [] hi   Temperature: [x] lo [] med [] hi   [] Skin assessment post-treatment:  []intact []redness- no adverse reaction    []redness  adverse reaction: 49 min Therapeutic Exercise:  [x] See flow sheet :   Rationale: increase ROM, increase strength and increase proprioception to improve the patients ability to perform ADL's.    8 min Manual Therapy:  Left tibiofemoral ext/flex mobs grade II. PROM ext/flex. STM gastroc. The manual therapy interventions were performed at a separate and distinct time from the therapeutic activities interventions. Rationale: decrease pain, increase ROM, increase tissue extensibility and decrease trigger points to prepare for ambulation without brace or AD. With   [x] TE   [] TA   [] neuro   [] other: Patient Education: [x] Review HEP    [] Progressed/Changed HEP based on:   [] positioning   [] body mechanics   [] transfers   [] heat/ice application    [] other:      Other Objective/Functional Measures: PROM Left knee flexion 54 degrees, ext 7 degrees. Extensive education on healing process and progression of protocol, as well as the importance of performing HEP daily. Pain Level (0-10 scale) post treatment: 3/10    ASSESSMENT/Changes in Function: Increased brace to 60 degrees flexion. Knee mobility has improved since IE. Continue [place] E-stim to facilitate quad contraction. Continue PT per protocol. Patient will continue to benefit from skilled PT services to modify and progress therapeutic interventions, address functional mobility deficits, address ROM deficits, address strength deficits, analyze and address soft tissue restrictions and analyze and cue movement patterns to attain remaining goals. [x]  See Plan of Care  []  See progress note/recertification  []  See Discharge Summary         Progress towards goals / Updated goals:  Short Term Goals: To be accomplished in 2 weeks:               1.  The patient will demonstrate independence and compliance with HEP to maximize therapeutic benefit.              IE: issued HEP              LDQHBTV: Met 8/16/21 Pt reports understanding and compliance.               2. The patient will improve left knee extension to 0 degrees passively to improve ease of ambulation efficiency.             NM: lacking 24 degrees actively   Current: Lacking 7 degrees. 8/24/2021  Long Term Goals: To be accomplished in 4 weeks:               1. The patient will improve FOTO score to 55 to improve ease of ADLs.             MW: 25               7. The patient will improve left knee flexion to 90 degrees to improve ease of transfers.              IE 40/50 degrees active/passive.               3. The patient will demonstrate effective quad setting in order to progress stability in prep for WB.              IE: poor quad set               4. The patient will demonstrate active left knee extension to 0 degrees to improve ease of ambulation efficiency.                IE: lacking 24 degrees actively    PLAN  []  Upgrade activities as tolerated     [x]  Continue plan of care  []  Update interventions per flow sheet       []  Discharge due to:_  []  Other:_      Dave Head, PTA 8/24/2021  12:04 PM    Future Appointments   Date Time Provider Sol Mojgan   8/25/2021  8:15 AM Lisy Pierce, PT [facility]   8/27/2021 12:45 PM Jovany Osullivan PTA [facility]   8/30/2021 12:00 PM Prabhu Atkinson, PTA [facility]   9/1/2021 12:00 PM Jovany Osullivan, PTA [facility]   9/3/2021 11:30 AM Fatou Goddard, PT [facility]

## 2021-08-25 ENCOUNTER — HOSPITAL ENCOUNTER (OUTPATIENT)
Dept: PHYSICAL THERAPY | Age: 46
Discharge: HOME | End: 2021-08-25
Payer: MEDICAID

## 2021-08-25 PROCEDURE — 97530 THERAPEUTIC ACTIVITIES: CPT

## 2021-08-25 PROCEDURE — 97032 APPL MODALITY 1+ESTIM EA 15: CPT

## 2021-08-25 PROCEDURE — 97110 THERAPEUTIC EXERCISES: CPT

## 2021-08-25 PROCEDURE — 97016 VASOPNEUMATIC DEVICE THERAPY: CPT

## 2021-08-25 NOTE — PROGRESS NOTES
PT DAILY TREATMENT NOTE 10-18    Patient Name: Salvador Dela Cruz  Date:2021  : 1975  [x]  Patient  Verified  Payor: Kimmie Blood / Plan: [address] [place] [address] / Product Type: Managed Care Medicaid /    In time:819  Out time:912  Total Treatment Time (min): 53  Visit #: 5 of 18  Treatment Area: Left leg pain [M79.605]    SUBJECTIVE  Pain Level (0-10 scale): 4  Any medication changes, allergies to medications, adverse drug reactions, diagnosis change, or new procedure performed?: [x] No    [] Yes (see summary sheet for update)  Subjective functional status/changes:   [] No changes reported  My foot is swollen, so I couldn't wear my tennis shoes.     OBJECTIVE    Modality rationale: decrease edema, decrease inflammation, decrease pain and increase muscle contraction/control to improve the patients ability to perform daily activities and increase ease with ambulation    Min Type Additional Details    [] Estim:  []Unatt       []IFC  []Premod                        []Other:  []w/ice   []w/heat  Position:  Location:   10 [x] Estim: []Att    []TENS instruct  [x]NMES                    []Other:  []w/US   []w/ice   []w/heat  Position:long sitting  Location:left quad    []  Traction: [] Cervical       []Lumbar                       [] Prone          []Supine                       []Intermittent   []Continuous Lbs:  [] before manual  [] after manual    []  Ultrasound: []Continuous   [] Pulsed                           []1MHz   []3MHz W/cm2:  Location:    []  Iontophoresis with dexamethasone         Location: [] Take home patch   [] In clinic    []  Ice     []  heat  []  Ice massage  []  Laser   []  Anodyne Position:  Location:    []  Laser with stim  []  Other:  Position:  Location:   10 [x]  Vasopneumatic Device Pressure:       [x] lo [] med [] hi   Temperature: [x] lo [] med [] hi   [] Skin assessment post-treatment:  []intact []redness- no adverse reaction    []redness  adverse reaction:       20 min Therapeutic Exercise:  [] See flow sheet :   Rationale: increase ROM and increase strength to improve the patients ability to perform daily activities      8 min Therapeutic Activity:  []  See flow sheet :   Rationale: increase ROM and increase strength  to improve the patients ability to perform ADLs    5 min Manual Therapy:  Grade 4 left patellar mobs/ scar massage; grade 2 tib/fib   The manual therapy interventions were performed at a separate and distinct time from the therapeutic activities interventions. Rationale: decrease pain, increase ROM and increase tissue extensibility to perform ADLs  With   [] TE   [] TA   [] neuro   [] other: Patient Education: [x] Review HEP    [] Progressed/Changed HEP based on:   [] positioning   [] body mechanics   [] transfers   [] heat/ice application    [] other:      Other Objective/Functional Measures:      Pain Level (0-10 scale) post treatment: 1-2    ASSESSMENT/Changes in Function: PROM is improving with each session. Poor quad contraction noted with NMES, as patient does not tolerate high intensity NMES to achieve and adequate contraction. Patient will continue to benefit from skilled PT services to modify and progress therapeutic interventions, address functional mobility deficits, address ROM deficits, address strength deficits, analyze and address soft tissue restrictions, analyze and cue movement patterns and address imbalance/dizziness to attain remaining goals. []  See Plan of Care  []  See progress note/recertification  []  See Discharge Summary         Progress towards goals / Updated goals:  Short Term Goals: To be accomplished in 2 weeks:               1. The patient will demonstrate independence and compliance with HEP to maximize therapeutic benefit.              IE: issued HEP              JASQUVT: Met 8/16/21 Pt reports understanding and compliance.               2.  The patient will improve left knee extension to 0 degrees passively to improve ease of ambulation efficiency.             IK: lacking 24 degrees actively              Current: Lacking 7 degrees. 8/24/2021  Long Term Goals: To be accomplished in 4 weeks:               1. The patient will improve FOTO score to 55 to improve ease of ADLs.             FF: 19               4. The patient will improve left knee flexion to 90 degrees to improve ease of transfers.              IE 40/50 degrees active/passive. Current: 57 degrees passively 8/25/21               3. The patient will demonstrate effective quad setting in order to progress stability in prep for WB.              IE: poor quad set               4. The patient will demonstrate active left knee extension to 0 degrees to improve ease of ambulation efficiency.                IE: lacking 24 degrees actively    PLAN  [x]  Upgrade activities as tolerated     []  Continue plan of care  []  Update interventions per flow sheet       []  Discharge due to:_  []  Other:_      Adelso Neri, MPT, CMTPT 8/25/2021  8:12 AM    Future Appointments   Date Time Provider Sol Ulrich   8/25/2021  8:15 AM Maya Lee, PT [facility]   8/27/2021 12:45 PM Surekha Umanzor, PTA [facility]   8/30/2021 12:00 PM Vito Robledo, PTA [facility]   9/1/2021 12:00 PM Surekha Umanzor, PTA [facility]   9/3/2021 11:30 AM Carly Goddard, PT [facility]

## 2021-08-27 ENCOUNTER — HOSPITAL ENCOUNTER (OUTPATIENT)
Dept: PHYSICAL THERAPY | Age: 46
Discharge: HOME | End: 2021-08-27
Payer: MEDICAID

## 2021-08-27 PROCEDURE — 97110 THERAPEUTIC EXERCISES: CPT

## 2021-08-27 PROCEDURE — 97530 THERAPEUTIC ACTIVITIES: CPT

## 2021-08-27 NOTE — PROGRESS NOTES
PT DAILY TREATMENT NOTE     Patient Name: Kathi Whitley  Date:2021  : 1975  [x]  Patient  Verified  Payor: Danielle Leger / Plan: [address] [place] [address] / Product Type: Managed Care Medicaid /    In time:12:50  Out time: 1:36  Total Treatment Time (min): 46  Visit #: 6 of 18      Treatment Area: Left leg pain [M79.605]    SUBJECTIVE  Pain Level (0-10 scale): 4  Any medication changes, allergies to medications, adverse drug reactions, diagnosis change, or new procedure performed?: [x] No    [] Yes (see summary sheet for update)  Subjective functional status/changes:   [] No changes reported  Pt reports her left leg is feeling a lot better coming in today than it did at her last session    OBJECTIVE    20 min Therapeutic Exercise:  [x] See flow sheet :   Rationale: increase ROM and increase strength to improve the patients ability to perform functional task with ease. 18 min Therapeutic Activity:  [x]  See flow sheet :   Rationale: increase strength and improve coordination  to improve the patients ability to perform daily task with ease. 8 min Manual Therapy:  Grade 4 left patellar mobs/ scar massage; grade 2 tib/fib   The manual therapy interventions were performed at a separate and distinct time from the therapeutic activities interventions. Rationale: decrease pain, increase ROM and increase tissue extensibility to improve functional mobility with ambulation ADL's.           With   [] TE   [] TA   [] neuro   [] other: Patient Education: [x] Review HEP    [] Progressed/Changed HEP based on:   [] positioning   [] body mechanics   [] transfers   [] heat/ice application    [] other:      Other Objective/Functional Measures: increase      Pain Level (0-10 scale) post treatment: 0    ASSESSMENT/Changes in Function:   Pt displays improved active left knee extension nearly meeting LTG #4 lacking 1 degrees to neutral. Pt reports her MD stated she is to remain TTWB for now and continue to change her degree of knee flexion by 10 degrees each week. Next week plan to change to 70 deg. Pt reports no pain with therex and was able to tolerate higher intensity with NMES this visit. No pain reported post treatment. Patient will continue to benefit from skilled PT services to modify and progress therapeutic interventions, address functional mobility deficits, address ROM deficits, address strength deficits, analyze and address soft tissue restrictions, analyze and cue movement patterns, analyze and modify body mechanics/ergonomics and assess and modify postural abnormalities to attain remaining goals. [x]  See Plan of Care  []  See progress note/recertification  []  See Discharge Summary         Progress towards goals / Updated goals:  Short Term Goals: To be accomplished in 2 weeks:               1. The patient will demonstrate independence and compliance with HEP to maximize therapeutic benefit.              IE: issued HEP              [id number]: Met 8/16/21 Pt reports understanding and compliance.               2. The patient will improve left knee extension to 0 degrees passively to improve ease of ambulation efficiency.             JH: lacking 24 degrees actively              Current: Lacking 7 40 [facility] be accomplished in 4 weeks:               1. The patient will improve FOTO score to 55 to improve ease of ADLs.             UE: 84               4. The patient will improve left knee flexion to 90 degrees to improve ease of transfers.              IE 40/50 degrees active/passive. Current: 57 degrees passively 8/25/21               3. The patient will demonstrate effective quad setting in order to progress stability in prep for WB.              IE: poor quad set               4. The patient will demonstrate active left knee extension to 0 degrees to improve ease of ambulation efficiency.                IE: lacking 24 degrees actively   Current: near met 8/27/21 pt lacking 1 deg of left knee ext       PLAN  []  Upgrade activities as tolerated     [x]  Continue plan of care  []  Update interventions per flow sheet       []  Discharge due to:_  []  Other:_      Chidi Manrique, PTA 8/27/2021  12:56 PM    Future Appointments   Date Time Provider Sol Ulrich   8/30/2021 12:00 PM Sofy Mike, NACHO [facility]   9/1/2021 12:00 PM Haleigh Casillas, PTA [facility]   9/3/2021 11:30 AM Rosie Goddard, PT [facility]

## 2021-08-30 ENCOUNTER — HOSPITAL ENCOUNTER (OUTPATIENT)
Dept: PHYSICAL THERAPY | Age: 46
Discharge: HOME | End: 2021-08-30
Payer: MEDICAID

## 2021-08-30 PROCEDURE — 97110 THERAPEUTIC EXERCISES: CPT

## 2021-08-30 PROCEDURE — 97032 APPL MODALITY 1+ESTIM EA 15: CPT

## 2021-08-30 PROCEDURE — 97112 NEUROMUSCULAR REEDUCATION: CPT

## 2021-08-30 NOTE — PROGRESS NOTES
PT DAILY TREATMENT NOTE     Patient Name: Kathi Whitley  Date:2021  : 1975  [x]  Patient  Verified  Payor: [address] / Plan: [address] [facility] / Product Type: Managed Care Medicaid /    In time:12:09  Out time:12:52  Total Treatment Time (min): 43  Visit #: 7 of 18    Treatment Area: Left leg pain [M79.605]    SUBJECTIVE  Pain Level (0-10 scale): 0/10  Any medication changes, allergies to medications, adverse drug reactions, diagnosis change, or new procedure performed?: [x] No    [] Yes (see summary sheet for update)  Subjective functional status/changes:   [] No changes reported  Pt reports no new complaints of pain. Pt reports inconsistent compliance with HEP. OBJECTIVE    Modality rationale: decrease inflammation and decrease pain to improve the patients ability to perform ADLs with increased ease.     Min Type Additional Details    [] Estim:  []Unatt       []IFC  []Premod                        []Other:  []w/ice   []w/heat  Position:  Location:   8+2 [x] Estim: [x]Att    []TENS instruct  []NMES                    []Other:  []w/US   []w/ice   []w/heat  Position: sitting  Location:left quad    []  Traction: [] Cervical       []Lumbar                       [] Prone          []Supine                       []Intermittent   []Continuous Lbs:  [] before manual  [] after manual    []  Ultrasound: []Continuous   [] Pulsed                           []1MHz   []3MHz W/cm2:  Location:    []  Iontophoresis with dexamethasone         Location: [] Take home patch   [] In clinic    []  Ice     []  heat  []  Ice massage  []  Laser   []  Anodyne Position:  Location:    []  Laser with stim  []  Other:  Position:  Location:    []  Vasopneumatic Device    []  Right     []  Left  Pre-treatment girth:  Post-treatment girth:  Measured at (location):  Pressure:       [] lo [] med [] hi   Temperature: [] lo [] med [] hi   [] Skin assessment post-treatment:  []intact []redness- no adverse reaction    []redness  adverse reaction:     15 min Therapeutic Exercise:  [x] See flow sheet :   Rationale: increase ROM and increase strength to improve the patients ability to perform ADLs with increased ease. 10 min Therapeutic Activity:  [x]  See flow sheet :   Rationale: increase strength, improve coordination and increase proprioception  to improve the patients ability to perform ADLs with increased ease. 8 min Manual Therapy:Pt in supine: Patellar mobs inferior/superior grade III-IV; grade 2 tib/fib mobs; The manual therapy interventions were performed at a separate and distinct time from the therapeutic activities interventions. Rationale: decrease pain, increase ROM and increase tissue extensibility to improve functional mobility of left LE. With   [] TE   [] TA   [] neuro   [] other: Patient Education: [x] Review HEP    [] Progressed/Changed HEP based on:   [] positioning   [] body mechanics   [] transfers   [] heat/ice application    [] other:      Other Objective/Functional Measures:      Pain Level (0-10 scale) post treatment: 0/10    ASSESSMENT/Changes in Function: Pt demonstrates continued poor left quad control with quad sets. Pt demonstrates continued limited available PROM knee flexion. Pt has no adverse reaction to treatment. Patient will continue to benefit from skilled PT services to modify and progress therapeutic interventions, address functional mobility deficits, address ROM deficits, address strength deficits, analyze and address soft tissue restrictions, analyze and cue movement patterns and analyze and modify body mechanics/ergonomics to attain remaining goals. []  See Plan of Care  []  See progress note/recertification  []  See Discharge Summary         Progress towards goals / Updated goals:  Short Term Goals: To be accomplished in 2 weeks:               1.  The patient will demonstrate independence and compliance with HEP to maximize therapeutic benefit.              QD: issued HEP              DRGOADF: Met 8/16/21 Pt reports understanding and compliance.               2. The patient will improve left knee extension to 0 degrees passively to improve ease of ambulation efficiency.             EJ: lacking 24 degrees actively              Current: Lacking 7 [address] be accomplished in 4 weeks:               1. The patient will improve FOTO score to 55 to improve ease of ADLs.             HB: 14               3. The patient will improve left knee flexion to 90 degrees to improve ease of transfers.              IE 40/50 degrees active/passive.              Current: 57 degrees passively 8/25/21               3. The patient will demonstrate effective quad setting in order to progress stability in prep for WB.              IE: poor quad set               4. The patient will demonstrate active left knee extension to 0 degrees to improve ease of ambulation efficiency.                IE: lacking 24 degrees actively              Current: near met 8/27/21 pt lacking 1 deg of left knee ext    PLAN  []  Upgrade activities as tolerated     [x]  Continue plan of care  []  Update interventions per flow sheet       []  Discharge due to:_  []  Other:_      Eneida Ellis PTA 8/30/2021  12:13 PM    Future Appointments   Date Time Provider Sol Ulrich   9/1/2021 12:00 PM Justine Razo PTA [facility]   9/3/2021 11:30 AM [facility]ard[facility]

## 2021-09-01 ENCOUNTER — APPOINTMENT (OUTPATIENT)
Dept: PHYSICAL THERAPY | Age: 46
End: 2021-09-01
Payer: MEDICAID

## 2021-09-03 ENCOUNTER — HOSPITAL ENCOUNTER (OUTPATIENT)
Dept: PHYSICAL THERAPY | Age: 46
Discharge: HOME | End: 2021-09-03
Payer: MEDICAID

## 2021-09-03 PROCEDURE — 97110 THERAPEUTIC EXERCISES: CPT

## 2021-09-03 PROCEDURE — 97112 NEUROMUSCULAR REEDUCATION: CPT

## 2021-09-03 NOTE — PROGRESS NOTES
PT DAILY TREATMENT NOTE     Patient Name: Kathi Whitley  Date:9/3/2021  : 1975  [x]  Patient  Verified  Payor: [address] / Plan: [address] [facility] / Product Type: Managed Care Medicaid /    In time:11:30  Out time:12:16  Total Treatment Time (min): 46  Visit #: 8 of 18    Treatment Area: Left leg pain [M79.605]    SUBJECTIVE  Pain Level (0-10 scale): 6/10  Any medication changes, allergies to medications, adverse drug reactions, diagnosis change, or new procedure performed?: [x] No    [] Yes (see summary sheet for update)  Subjective functional status/changes:   [] No changes reported  The patient reports that her knee has been aching over the last few days, but denies any mode of injury, stating \"it may be the weather\". OBJECTIVE  26 min Therapeutic Exercise:  [x] See flow sheet :   Rationale: increase ROM and increase strength to improve the patients ability to improve ADL ease. 10 min Neuromuscular Re-education:  []  See flow sheet  Quad work with SLR, quad sets, AAROM LAQ:   Rationale: increase ROM, increase strength and improve coordination  to improve the patients ability to improve ADL ease. 10 min Manual Therapy:  Tibiofemoral flexion. /extension mobs performed in seated and supine grade II to III with inferior patellar mobs utilized in seated position to progress left knee flexion. The manual therapy interventions were performed at a separate and distinct time from the therapeutic activities interventions. Rationale: decrease pain, increase ROM and increase tissue extensibility to improve ADL ease. With   [] TE   [] TA   [] neuro   [] other: Patient Education: [x] Review HEP    [] Progressed/Changed HEP based on:   [] positioning   [] body mechanics   [] transfers   [] heat/ice application    [] other:      Other Objective/Functional Measures:   Unable to perform [place] ES due to inadequate apparel.      Pain Level (0-10 scale) post treatment: 2-3/10    ASSESSMENT/Changes in Function: The patient is progressing slowly with regards to left knee flexion, attaining 65 degrees passively today. She was able to attain SLR actively with brace locked during today's session and left in considerably less pain than arrival.     Patient will continue to benefit from skilled PT services to modify and progress therapeutic interventions, address functional mobility deficits, address ROM deficits, address strength deficits, analyze and address soft tissue restrictions, analyze and cue movement patterns, analyze and modify body mechanics/ergonomics, assess and modify postural abnormalities and instruct in home and community integration to attain remaining goals. []  See Plan of Care  []  See progress note/recertification  []  See Discharge Summary         Progress towards goals / Updated goals:  Short Term Goals: To be accomplished in 2 weeks:               1. The patient will demonstrate independence and compliance with HEP to maximize therapeutic benefit.              IE: issued HEP              DEQOFYZ: Met 8/16/21 Pt reports understanding and compliance.               2. The patient will improve left knee extension to 0 degrees passively to improve ease of ambulation efficiency.             ES: lacking 24 degrees actively              Current: Lacking 7 [address] [facility] be accomplished in 4 weeks:               1. The patient will improve FOTO score to 55 to improve ease of ADLs.             IC: 24               2. The patient will improve left knee flexion to 90 degrees to improve ease of transfers.              IE 40/50 degrees active/passive.              Current: Progressed to 65 degrees passively 9/03/2021               3. The patient will demonstrate effective quad setting in order to progress stability in prep for WB.              IE: poor quad set                4.  The patient will demonstrate active left knee extension to 0 degrees to improve ease of ambulation efficiency.                IE: lacking 24 degrees actively              Current: near met 8/27/21 pt lacking 1 deg of left knee ext    PLAN  []  Upgrade activities as tolerated     [x]  Continue plan of care  []  Update interventions per flow sheet       []  Discharge due to:_  []  Other:_      Savilla Primrose, PT 9/3/2021  11:58 AM    No future appointments.

## 2021-09-09 ENCOUNTER — HOSPITAL ENCOUNTER (OUTPATIENT)
Dept: PHYSICAL THERAPY | Age: 46
Discharge: HOME | End: 2021-09-09
Payer: MEDICAID

## 2021-09-09 PROCEDURE — 97112 NEUROMUSCULAR REEDUCATION: CPT

## 2021-09-09 PROCEDURE — 97110 THERAPEUTIC EXERCISES: CPT

## 2021-09-09 PROCEDURE — 97032 APPL MODALITY 1+ESTIM EA 15: CPT

## 2021-09-09 NOTE — PROGRESS NOTES
PT DAILY TREATMENT NOTE     Patient Name: Dalia Laboy  Date:2021  : 1975  [x]  Patient  Verified  Payor: [address] / Plan: [address] [facility] / Product Type: Managed Care Medicaid /    In time:1130AM  Out time:1221pm  Total Treatment Time (min): 51  Visit #: 9 of 18     Treatment Area: Left leg pain [M79.605]    SUBJECTIVE  Pain Level (0-10 scale): 6  Any medication changes, allergies to medications, adverse drug reactions, diagnosis change, or new procedure performed?: [x] No    [] Yes (see summary sheet for update)  Subjective functional status/changes:   [] No changes reported  More pain today with the rain. OBJECTIVE    Modality rationale: increase muscle contraction/control to improve the patients ability to ambulate with improved control   Min Type Additional Details   10 [x] Estim: [x]Att    []TENS instruct  [x]NMES                    []Other:  []w/US   []w/ice   []w/heat  Position:long sit  Location: quads    []  Vasopneumatic Device    []  Right     []  Left  Pre-treatment girth:  Post-treatment girth:  Measured at (location):  Pressure:       [] lo [] med [] hi   Temperature: [] lo [] med [] hi   [] Skin assessment post-treatment:  []intact []redness- no adverse reaction    []redness  adverse reaction:     21 min Therapeutic Exercise:  [x] See flow sheet :   Rationale: increase ROM and increase strength to improve the patients ability to improve ADL ease. 10 min Neuromuscular Re-education:  [x]  See flow sheet :   Rationale: increase ROM, increase strength and improve coordination  to improve the patients ability to improve ADL ease. 10 min Manual Therapy:  tib-fem flexion/extension GR III oscillations performed in sitting followed by inferior patellar oscillations GR III. In supine performed GR III anterior tib fem oscillations with tibial ER to promote screw-home mechanism.     The manual therapy interventions were performed at a separate and distinct time from the therapeutic activities interventions. Rationale: decrease pain, increase ROM and increase tissue extensibility to improve ADL ease. With   [] TE   [] TA   [] neuro   [] other: Patient Education: [x] Review HEP    [] Progressed/Changed HEP based on:   [] positioning   [] body mechanics   [] transfers   [] heat/ice application    [] other:      Other Objective/Functional Measures: see PN     Pain Level (0-10 scale) post treatment: 2    ASSESSMENT/Changes in Function: Pt demonstrates slow improvement in ROM 0-1-75 deg PROM, strength, flexibility, and joint mobility since her SOC. She now demonstrates a fair quad set but due to moderate quad lag, her brace has been locked with active SLR repetitions. [place] NMES has been trialed within clinic to improve quadriceps contraction, but she has not yet been able to tolerate a strong enough power to yield a muscle contraction. Pt will continue to benefit from skilled PT sessions to address impairments in strength, ROM, mobility, flexibility, ambulation, transfers, and ease of ADLs. Patient will continue to benefit from skilled PT services to modify and progress therapeutic interventions, address functional mobility deficits, address ROM deficits, address strength deficits, analyze and address soft tissue restrictions, analyze and cue movement patterns, analyze and modify body mechanics/ergonomics, assess and modify postural abnormalities, address imbalance/dizziness and instruct in home and community integration to attain remaining goals. []  See Plan of Care  []  See progress note/recertification  []  See Discharge Summary         Progress towards goals / Updated goals:  Short Term Goals: To be accomplished in 2 weeks:               1.  The patient will demonstrate independence and compliance with HEP to maximize therapeutic benefit.              IE: issued HEP              HLGJNYF: Met 8/16/21 Pt reports understanding and compliance.               2. The patient will improve left knee extension to 0 degrees passively to improve ease of ambulation efficiency.             UK: lacking 24 degrees actively              Current: Lacking [address] [facility] [address] [facility] [address] be accomplished in 4 weeks:               1. The patient will improve FOTO score to 55 to improve ease of ADLs.             DO: 71   Current: regressed to 25 (9/9/2021)               2. The patient will improve left knee flexion to 90 degrees to improve ease of transfers.              IE 40/50 degrees active/passive.               Current: Progressed to 65 degrees passively 9/03/2021               3. The patient will demonstrate effective quad setting in order to progress stability in prep for WB.              IE: poor quad set    Current: progressing, fair quad set; requires brace locked to perform controlled SLR. (9/9/2021)               4. The patient will demonstrate active left knee extension to 0 degrees to improve ease of ambulation efficiency.                IE: lacking 24 degrees actively              Current: near met 8/27/21 pt lacking 1 deg of left knee ext    PLAN  []  Upgrade activities as tolerated     [x]  Continue plan of care  []  Update interventions per flow sheet       []  Discharge due to:_  []  Other:_      Maynor Medrano, PT 9/9/2021  11:32 AM    Future Appointments   Date Time Provider Sol Ulrich   9/13/2021  9:15 AM Liya Birch, PTA [facility]   9/15/2021 12:45 PM Magen Garcia, PT [facility]   9/17/2021 11:30 AM Ysabel Renner, PTA [facility]   9/20/2021 12:00 PM Park Guerrero, PTA [facility]   9/22/2021 12:45 PM Sanaz Smalls, PT [facility]   9/24/2021 12:00 PM Park Guerrero, PTA [facility]   9/27/2021 12:00 PM Park Guerrero, PTA [facility]   9/29/2021 11:30 AM Edelmira Goddard, PT [facility]

## 2021-09-09 NOTE — PROGRESS NOTES
[facility]  [address]  [place], [address]  [phone] fax    Physical Therapy Progress Note  Patient name: Javier Frazier Start of Care: 2021   Referral source: Jomar Leyva : 1975   Medical/Treatment Diagnosis: Left leg pain [M79.605]  Payor: [place] [employer] / Plan: [address] [place] [address] / Product Type: Managed Care Medicaid /  Onset Date:2021     Prior Hospitalization: see medical history Provider#: [id number]   Medications: Verified on Patient Summary List    Comorbidities: HTN, asthma, left knee ORIF   Prior Level of Function: The patient was ambulatory independently. Visits from Start of Care: 9    Missed Visits: 2      Established Goals:         Short Term Goals: To be accomplished in 2 weeks:               1. The patient will demonstrate independence and compliance with HEP to maximize therapeutic benefit.              IE: issued HEP              QQNUKFR: Met, Pt reports understanding and compliance.               2. The patient will improve left knee extension to 0 degrees passively to improve ease of ambulation efficiency.             GK: lacking 24 degrees actively              Current: Lacking 1 degree  Long Term Goals: To be accomplished in 4 weeks:               1. The patient will improve FOTO score to 55 to improve ease of ADLs.             UH: 00              Current: regressed to 25                2. The patient will improve left knee flexion to 90 degrees to improve ease of transfers.              IE 40/50 degrees active/passive.               Current: Progressed to 75 degrees passively                3. The patient will demonstrate effective quad setting in order to progress stability in prep for WB.              IE: poor quad set               Current: progressing, fair quad set; requires brace locked to perform controlled SLR.              4.  The patient will demonstrate active left knee extension to 0 degrees to improve ease of ambulation efficiency.                IE: lacking 24 degrees actively              Current: near met, pt lacking 1 deg of left knee ext    Key Functional Changes: knee ROM 0-1-75 PROM    Updated Goals: to be achieved in 6 weeks:  1. The patient will improve FOTO score to 55 to improve ease of ADLs. PN: regressed to 25   2. The patient will improve left knee flexion to 90 degrees to improve ease of transfers.              PN: Progressed to 75 degrees passively   3. The patient will demonstrate effective quad setting in order to progress stability in prep for WB. PN: progressing, fair quad set; requires brace locked to perform controlled SLR. 4. The patient will demonstrate active left knee extension to 0 degrees to improve ease of ambulation efficiency.                PN: near met, pt lacking 1 deg of left knee ext      ASSESSMENT/RECOMMENDATIONS:  Pt demonstrates slow improvement in ROM 0-1-75 deg PROM, strength, flexibility, and joint mobility since her SOC. She now demonstrates a fair quad set but due to moderate quad lag, her brace has been locked with active SLR repetitions. [place] NMES has been trialed within clinic to improve quadriceps contraction, but she has not yet been able to tolerate a strong enough power to yield a muscle contraction. Pt will continue to benefit from skilled PT sessions to address impairments in strength, ROM, mobility, flexibility, ambulation, transfers, and ease of ADLs.      [x]Continue therapy per initial plan/protocol at a frequency of  3 x per week for 6 weeks  []Continue therapy with the following recommended changes:_____________________      _____________________________________________________________________  []Discontinue therapy progressing towards or have reached established goals  []Discontinue therapy due to lack of appreciable progress towards goals  []Discontinue therapy due to lack of attendance or compliance  []Await Physician's recommendations/decisions regarding therapy  []Other:________________________________________________________________    Thank you for this referral.    Jose Juan Rodriguez, PT 9/9/2021 1:19 PM  NOTE TO PHYSICIAN:  PLEASE COMPLETE THE ORDERS BELOW AND   FAX TO [facility]: [phone]  If you are unable to process this request in 24 hours please contact our office: [phone]    ? I have read the above report and request that my patient continue as recommended. ? I have read the above report and request that my patient continue therapy with the following changes/special instructions:__________________________________________________________  ? I have read the above report and request that my patient be discharged from therapy.     Physicians signature: ______________________________Date: ______Time:______     GENET John

## 2021-09-13 ENCOUNTER — HOSPITAL ENCOUNTER (OUTPATIENT)
Dept: PHYSICAL THERAPY | Age: 46
Discharge: HOME | End: 2021-09-13
Payer: MEDICAID

## 2021-09-13 PROCEDURE — 97032 APPL MODALITY 1+ESTIM EA 15: CPT

## 2021-09-13 PROCEDURE — 97110 THERAPEUTIC EXERCISES: CPT

## 2021-09-13 PROCEDURE — 97112 NEUROMUSCULAR REEDUCATION: CPT

## 2021-09-13 PROCEDURE — 97140 MANUAL THERAPY 1/> REGIONS: CPT

## 2021-09-13 NOTE — PROGRESS NOTES
PT DAILY TREATMENT NOTE     Patient Name: Adelita Pascual  Date:2021  : 1975  [x]  Patient  Verified  Payor: [address] / Plan: [address] [facility] / Product Type: Managed Care Medicaid /    In time:9:15  Out time:10:12  Total Treatment Time (min): 62  Visit #: 1 of 18    Treatment Area: Left leg pain [M79.605]    SUBJECTIVE  Pain Level (0-10 scale): 5  Any medication changes, allergies to medications, adverse drug reactions, diagnosis change, or new procedure performed?: [x] No    [] Yes (see summary sheet for update)  Subjective functional status/changes:   [] No changes reported  Pt reports knee feels tighter today, she did a lot over the weekend    OBJECTIVE    Modality rationale: decrease inflammation, decrease pain and increase muscle contraction/control to improve the patients ability to perform daily tasks   Min Type Additional Details    [] Estim:  []Unatt       []IFC  []Premod                        []Other:  []w/ice   []w/heat  Position:  Location:   10 [x] Estim: [x]Att    []TENS instruct  [x]NMES                    []Other:  []w/US   []w/ice   []w/heat  Position: long sit  Location: left quad    []  Traction: [] Cervical       []Lumbar                       [] Prone          []Supine                       []Intermittent   []Continuous Lbs:  [] before manual  [] after manual    []  Ultrasound: []Continuous   [] Pulsed                           []1MHz   []3MHz W/cm2:  Location:    []  Iontophoresis with dexamethasone         Location: [] Take home patch   [] In clinic    []  Ice     []  heat  []  Ice massage  []  Laser   []  Anodyne Position:  Location:    []  Laser with stim  []  Other:  Position:  Location:   10 [x]  Vasopneumatic Device    []  Right     [x]  Left  Pre-treatment girth:  Post-treatment girth:  Measured at (location):  Pressure:       [x] lo [] med [] hi   Temperature: [x] lo [] med [] hi   [] Skin assessment post-treatment:  []intact []redness- no adverse reaction    []redness  adverse reaction:     17 min Therapeutic Exercise:  [x] See flow sheet :   Rationale: increase ROM and increase strength to improve the patients ability to perform ADLs    10 min Neuromuscular Re-education:  [x]  See flow sheet :   Rationale: increase strength, improve coordination and increase proprioception  to improve the patients ability to perform functional     10 min Manual Therapy:  Seated: tib/fem distraction with gentle stretching into flex. Supine:patella mobs, tib/fem jt mobs with gentle PROM   The manual therapy interventions were performed at a separate and distinct time from the therapeutic activities interventions. Rationale: decrease pain, increase ROM and increase tissue extensibility to improve functional mobility            With   [] TE   [] TA   [] neuro   [] other: Patient Education: [x] Review HEP    [] Progressed/Changed HEP based on:   [] positioning   [] body mechanics   [] transfers   [] heat/ice application    [] other:      Other Objective/Functional Measures:   PROM knee flex 69*     Pain Level (0-10 scale) post treatment: 2    ASSESSMENT/Changes in Function: Demo's decr'd knee flex ROM today with reports of knee feeling more swollen and tight. Pt reports incr'd activity over the weekend that might have caused incr;d knee tightness. Able to perform all prescribed therex. Decr'd pain following treatment today. Patient will continue to benefit from skilled PT services to modify and progress therapeutic interventions, address functional mobility deficits, address ROM deficits, address strength deficits, analyze and address soft tissue restrictions, analyze and cue movement patterns and analyze and modify body mechanics/ergonomics to attain remaining goals. []  See Plan of Care  []  See progress note/recertification  []  See Discharge Summary         Progress towards goals / Updated goals:  1.  The patient will improve FOTO score to 55 to improve ease of ADLs.             PN: regressed to 25   2. The patient will improve left knee flexion to 90 degrees to improve ease of transfers.              PN: Progressed to 75 degrees passively   3. The patient will demonstrate effective quad setting in order to progress stability in prep for WB.              PN: progressing, fair quad set; requires brace locked to perform controlled SLR. 4. The patient will demonstrate active left knee extension to 0 degrees to improve ease of ambulation efficiency.                PN: near met, pt lacking 1 deg of left knee ext    PLAN  []  Upgrade activities as tolerated     [x]  Continue plan of care  []  Update interventions per flow sheet       []  Discharge due to:_  []  Other:_      Isaias Guzman Winter, PTA 9/13/2021  9:19 AM    Future Appointments   Date Time Provider Sol Ulrich   9/15/2021 12:45 PM Tamica Rodgers, PT [facility]   9/17/2021 11:30 AM Yvette Davidson, PTA [facility]   9/20/2021 12:00 PM Justine Razo, PTA [facility]   9/22/2021 12:45 PM Tamica Rodgers, PT [facility]   9/24/2021 12:00 PM Justine Razo, PTA [facility]   9/27/2021 12:00 PM Justine Razo, PTA [facility]   9/29/2021 11:30 AM Sunny Goddard, PT [facility]

## 2021-09-15 ENCOUNTER — HOSPITAL ENCOUNTER (OUTPATIENT)
Dept: PHYSICAL THERAPY | Age: 46
Discharge: HOME | End: 2021-09-15
Payer: MEDICAID

## 2021-09-15 PROCEDURE — 97110 THERAPEUTIC EXERCISES: CPT

## 2021-09-15 PROCEDURE — 97032 APPL MODALITY 1+ESTIM EA 15: CPT

## 2021-09-15 PROCEDURE — 97112 NEUROMUSCULAR REEDUCATION: CPT

## 2021-09-15 NOTE — PROGRESS NOTES
PT DAILY TREATMENT NOTE     Patient Name: Polina Pandya  Date:9/15/2021  : 1975  [x]  Patient  Verified  Payor: Sylvia Bonilla / Plan: Cherylene Brasher / Product Type: Managed Care Medicaid /    In time: 12:55 PM  Out time:1:37 PM  Total Treatment Time (min): 42  Visit #: 2 of 18    Treatment Area: Left leg pain [M79.605]    SUBJECTIVE  Pain Level (0-10 scale): 5  Any medication changes, allergies to medications, adverse drug reactions, diagnosis change, or new procedure performed?: [x] No    [] Yes (see summary sheet for update)  Subjective functional status/changes:   [] No changes reported  Reports feeling tight     OBJECTIVE    Modality rationale: increase muscle contraction/control to improve the patients ability to stabilize left LE in ambulation. Min Type Additional Details   10 [x] Estim: [x]Att    []TENS instruct  [x]NMES                    []Other: 30 mA []w/US   []w/ice   []w/heat  Position: long sit  Location: left quad       17 min Therapeutic Exercise:  [x] See flow sheet :   Rationale: increase ROM and increase strength to improve the patients ability to complete household chores. 10 min Neuromuscular Re-education:  [x]  See flow sheet :   Rationale: increase strength and increase proprioception  to improve the patients ability to navigate dynamic environments. 10 min Manual Therapy:    Seated: tib/fem distraction with gentle stretching into flex. Supine:patella mobs, tib/fem jt mobs with gentle PROM   The manual therapy interventions were performed at a separate and distinct time from the therapeutic activities interventions. Rationale: increase ROM and increase tissue extensibility to perform functional transfers with greater ease.         With   [] TE   [] TA   [] neuro   [] other: Patient Education: [x] Review HEP    [] Progressed/Changed HEP based on:   [] positioning   [] body mechanics   [] transfers   [] heat/ice application    [] other: Other Objective/Functional Measures:      Pain Level (0-10 scale) post treatment: 0    ASSESSMENT/Changes in Function: Patient 10 minutes late to session. Put forth good effort with all exercises. Reduced \"tightness\" reported following manual intervention. Patient will continue to benefit from skilled PT services to modify and progress therapeutic interventions, address functional mobility deficits, address ROM deficits, address strength deficits, analyze and address soft tissue restrictions and analyze and cue movement patterns to attain remaining goals. Progress towards goals / Updated goals:  1. The patient will improve FOTO score to 55 to improve ease of ADLs.             PN: regressed to 25   2. The patient will improve left knee flexion to 90 degrees to improve ease of transfers.              PN: Progressed to 75 degrees passively   3. The patient will demonstrate effective quad setting in order to progress stability in prep for WB.              PN: progressing, fair quad set; requires brace locked to perform controlled SLR. Current: 9/15/2021 - Progressing - fair quad setting without e-stim, brace required for SLR  4. The patient will demonstrate active left knee extension to 0 degrees to improve ease of ambulation efficiency.                PN: near met, pt lacking 1 deg of left knee ext       PLAN  []  Upgrade activities as tolerated     []  Continue plan of care  []  Update interventions per flow sheet       []  Discharge due to:_  []  Other:_      Zeyad Tolentino, PT 9/15/2021  12:58 PM    Future Appointments   Date Time Provider Sol Ulrich   9/17/2021 11:30 AM Joanna Lopes, PTA [facility]   9/20/2021 12:00 PM Neelam Genesee, PTA [facility]   9/22/2021  7:45 AM Mark Goddard, PT [facility]   9/24/2021 12:00 PM Neelam Genesee, PTA [facility]   9/27/2021 12:00 PM Neelam Genesee, PTA [facility]   9/29/2021 11:30 AM Mark Goddard, PT [facility]

## 2021-09-17 ENCOUNTER — HOSPITAL ENCOUNTER (OUTPATIENT)
Dept: PHYSICAL THERAPY | Age: 46
Discharge: HOME | End: 2021-09-17
Payer: MEDICAID

## 2021-09-17 PROCEDURE — 97032 APPL MODALITY 1+ESTIM EA 15: CPT

## 2021-09-17 PROCEDURE — 97110 THERAPEUTIC EXERCISES: CPT

## 2021-09-17 PROCEDURE — 97530 THERAPEUTIC ACTIVITIES: CPT

## 2021-09-17 PROCEDURE — 97112 NEUROMUSCULAR REEDUCATION: CPT

## 2021-09-17 NOTE — PROGRESS NOTES
PT DAILY TREATMENT NOTE     Patient Name: Polina Pandya  Date:2021  : 1975  [x]  Patient  Verified  Payor: Sylvia Bonilla / Plan: [address] [place] [address] / Product Type: Managed Care Medicaid /    In time:11:41  Out time:12:23  Total Treatment Time (min): 42  Visit #: 3 of 18    Treatment Area: Left leg pain [M79.605]    SUBJECTIVE  Pain Level (0-10 scale): 5/10  Any medication changes, allergies to medications, adverse drug reactions, diagnosis change, or new procedure performed?: [x] No    [] Yes (see summary sheet for update)  Subjective functional status/changes:   [] No changes reported  Pt reports her thigh is hurting today because she did too much last night. OBJECTIVE    Modality rationale: decrease inflammation and decrease pain to improve the patients ability to perform ADLs with increased ease.     Min Type Additional Details    [] Estim:  []Unatt       []IFC  []Premod                        []Other:  []w/ice   []w/heat  Position:  Location:   10 [x] Estim: [x]Att    []TENS instruct  []NMES                    []Other:  []w/US   []w/ice   []w/heat  Position: long istting  Location:left quad    []  Traction: [] Cervical       []Lumbar                       [] Prone          []Supine                       []Intermittent   []Continuous Lbs:  [] before manual  [] after manual    []  Ultrasound: []Continuous   [] Pulsed                           []1MHz   []3MHz W/cm2:  Location:    []  Iontophoresis with dexamethasone         Location: [] Take home patch   [] In clinic    []  Ice     []  heat  []  Ice massage  []  Laser   []  Anodyne Position:  Location:    []  Laser with stim  []  Other:  Position:  Location:    []  Vasopneumatic Device    []  Right     []  Left  Pre-treatment girth:  Post-treatment girth:  Measured at (location):  Pressure:       [] lo [] med [] hi   Temperature: [] lo [] med [] hi   [] Skin assessment post-treatment:  []intact []redness- no adverse reaction    []redness  adverse reaction:     14 min Therapeutic Exercise:  [x] See flow sheet :   Rationale: increase ROM and increase strength to improve the patients ability to perform ADLs with increased ease. 10 min Neuromuscular Re-education:  -  See flow sheet :   Rationale: increase strength, improve coordination and increase proprioception  to improve the patients ability to perform functional activities with increased ease. 8 min Manual Therapy:  PROM to left knee, Tib fem mobs grade II-III to improve knee flexion    The manual therapy interventions were performed at a separate and distinct time from the therapeutic activities interventions. Rationale: decrease pain, increase ROM and increase tissue extensibility to improve tolerance to ADLs. With   [] TE   [] TA   [] neuro   [] other: Patient Education: [x] Review HEP    [] Progressed/Changed HEP based on:   [] positioning   [] body mechanics   [] transfers   [] heat/ice application    [] other:      Other Objective/Functional Measures: PROM knee flexion 73 degrees. Pain Level (0-10 scale) post treatment: 0/10    ASSESSMENT/Changes in Function: Pt continues to have limited PROM of left knee flexion with firm end feel noted. Pts TROM brace is opened to 70 degrees, plan to open brace to 80 degrees next week. Patient will continue to benefit from skilled PT services to modify and progress therapeutic interventions, address functional mobility deficits, address ROM deficits, address strength deficits, analyze and address soft tissue restrictions, analyze and cue movement patterns, analyze and modify body mechanics/ergonomics and assess and modify postural abnormalities to attain remaining goals. []  See Plan of Care  []  See progress note/recertification  []  See Discharge Summary         Progress towards goals / Updated goals:  1. The patient will improve FOTO score to 55 to improve ease of ADLs.               VM: regressed to 25   2. The patient will improve left knee flexion to 90 degrees to improve ease of transfers.              PN: Progressed to 75 degrees passively    Current: 73 degrees. 09/17/2021  3. The patient will demonstrate effective quad setting in order to progress stability in prep for WB.              PN: progressing, fair quad set; requires brace locked to perform controlled SLR. Current: 9/15/2021 - Progressing - fair quad setting without e-stim, brace required for SLR  4. The patient will demonstrate active left knee extension to 0 degrees to improve ease of ambulation efficiency.                PN: near met, pt lacking 1 deg of left knee ext    PLAN  []  Upgrade activities as tolerated     [x]  Continue plan of care  []  Update interventions per flow sheet       []  Discharge due to:_  []  Other:_      Dolly Salas PTA 9/17/2021  11:49 AM    Future Appointments   Date Time Provider Sol Ulrich   9/20/2021 12:00 PM [facility], PTA [facility]   9/22/2021  7:45 AM Ping Goddard, PT [facility]   9/24/2021 12:00 PM [facility], PTA [facility]   9/27/2021 12:00 PM [facility], PTA [facility]   9/29/2021 11:30 AM Ping Goddard, PT [facility]

## 2021-09-20 ENCOUNTER — HOSPITAL ENCOUNTER (OUTPATIENT)
Dept: PHYSICAL THERAPY | Age: 46
Discharge: HOME | End: 2021-09-20
Payer: MEDICAID

## 2021-09-20 PROCEDURE — 97112 NEUROMUSCULAR REEDUCATION: CPT

## 2021-09-20 PROCEDURE — 97110 THERAPEUTIC EXERCISES: CPT

## 2021-09-20 NOTE — PROGRESS NOTES
PT DAILY TREATMENT NOTE     Patient Name: Misty Colón  Date:2021  : 1975  [x]  Patient  Verified  Payor: Stephen Eason / Plan: [address] [place] [address] / Product Type: Managed Care Medicaid /    In time: 12:03  Out time:11:52  Total Treatment Time (min): 49  Visit #: 4 of 18      Treatment Area: Left leg pain [M79.605]    SUBJECTIVE  Pain Level (0-10 scale): 3-4  Any medication changes, allergies to medications, adverse drug reactions, diagnosis change, or new procedure performed?: [x] No    [] Yes (see summary sheet for update)  Subjective functional status/changes:   [] No changes reported  Pt reports her left knee is feeling pretty good today with her pain at about a 3-4/10. OBJECTIVE    Modality rationale: decrease edema, decrease inflammation and decrease pain to improve the patients ability to perform functional task with ease.    Min Type Additional Details    [] Estim:  []Unatt       []IFC  []Premod                        []Other:  []w/ice   []w/heat  Position:  Location:   10 [x] Estim: [x]Att    []TENS instruct  []NMES                    []Other:  []w/US   []w/ice   []w/heat  Position: long sit   Location: left quad     []  Traction: [] Cervical       []Lumbar                       [] Prone          []Supine                       []Intermittent   []Continuous Lbs:  [] before manual  [] after manual    []  Ultrasound: []Continuous   [] Pulsed                           []1MHz   []3MHz W/cm2:  Location:    []  Iontophoresis with dexamethasone         Location: [] Take home patch   [] In clinic    []  Ice     []  heat  []  Ice massage  []  Laser   []  Anodyne Position:  Location:    []  Laser with stim  []  Other:  Position:  Location:    []  Vasopneumatic Device    []  Right     []  Left  Pre-treatment girth:  Post-treatment girth:  Measured at (location):  Pressure:       [] lo [] med [] hi   Temperature: [] lo [] med [] hi   [] Skin assessment post-treatment:  []intact []redness- no adverse reaction    []redness  adverse reaction:       21 min Therapeutic Exercise:  [x] See flow sheet :   Rationale: increase ROM and increase strength to improve the patients ability to perform functional task with ease. 10 min Neuromuscular Re-education:  [x]  See flow sheet :   Rationale: increase strength, improve coordination, improve balance and increase proprioception  to improve the patients ability to perform ADL's with ease. 9 min Manual Therapy:  PROM to left knee, Tib fem mobs grade II-III to improve knee flexion    The manual therapy interventions were performed at a separate and distinct time from the therapeutic activities interventions. Rationale: decrease pain, increase ROM, increase tissue extensibility and decrease edema  to improve functional mobility with ambulation with ADL's. With   [] TE   [] TA   [] neuro   [] other: Patient Education: [x] Review HEP    [] Progressed/Changed HEP based on:   [] positioning   [] body mechanics   [] transfers   [] heat/ice application    [] other:      Other Objective/Functional Measures:      Pain Level (0-10 scale) post treatment: 0    ASSESSMENT/Changes in Function:   No change in left knee ext with pt continuing to lack 1 deg of TKE. Pt to have brace opened to 80 deg NV. Pt notes decreased left medial quad pain and decreased swelling in her left LE this visit. End range flexion pain noted in the left knee during manual with pt continuing to displays limited left knee ROM/mobility. Continued treatment to improve left knee ROM and aid with ease of ADL's.     Patient will continue to benefit from skilled PT services to modify and progress therapeutic interventions, address functional mobility deficits, address ROM deficits, address strength deficits, analyze and address soft tissue restrictions, analyze and cue movement patterns, analyze and modify body mechanics/ergonomics and assess and modify postural abnormalities to attain remaining goals. [x]  See Plan of Care  []  See progress note/recertification  []  See Discharge Summary         Progress towards goals / Updated goals:  1. The patient will improve FOTO score to 55 to improve ease of ADLs.             PN: regressed to 25   2. The patient will improve left knee flexion to 90 degrees to improve ease of transfers.              PN: Progressed to 75 degrees passively               Current: 73 degrees. 09/17/2021  3. The patient will demonstrate effective quad setting in order to progress stability in prep for WB.              PN: progressing, fair quad set; requires brace locked to perform controlled SLR.                [id number]: 9/15/2021 - Progressing - fair quad setting without e-stim, brace required for SLR  4. The patient will demonstrate active left knee extension to 0 degrees to improve ease of ambulation efficiency.                PN: near met, pt lacking 1 deg of left knee ext   Current: no change 9/20/21 Pt lacking 1 deg of left knee ext    PLAN  []  Upgrade activities as tolerated     [x]  Continue plan of care  []  Update interventions per flow sheet       []  Discharge due to:_  []  Other:_      Danis Santizo, PTA 9/20/2021  12:09 PM    Future Appointments   Date Time Provider Sol Ulrich   9/22/2021  7:45 AM Justin Moore, PT [facility]   9/24/2021 12:00 PM Clearence Never, PTA [facility]   9/27/2021 12:00 PM Clearence Never, PTA [facility]   9/29/2021 11:30 AM Rosalia Goddard, PT [facility]

## 2021-09-22 ENCOUNTER — HOSPITAL ENCOUNTER (OUTPATIENT)
Dept: PHYSICAL THERAPY | Age: 46
Discharge: HOME | End: 2021-09-22
Payer: MEDICAID

## 2021-09-22 PROCEDURE — 97112 NEUROMUSCULAR REEDUCATION: CPT

## 2021-09-22 PROCEDURE — 97110 THERAPEUTIC EXERCISES: CPT

## 2021-09-22 NOTE — PROGRESS NOTES
PT DAILY TREATMENT NOTE     Patient Name: Lyudmila Risk  Date:2021  : 1975  [x]  Patient  Verified  Payor: [address] / Plan: [address] [facility] / Product Type: Managed Care Medicaid /    In time:7:58  Out time:8:27  Total Treatment Time (min): 29  Visit #: 5 of 18     Treatment Area: Left leg pain [M79.605]    SUBJECTIVE  Pain Level (0-10 scale): 8/10  Any medication changes, allergies to medications, adverse drug reactions, diagnosis change, or new procedure performed?: [x] No    [] Yes (see summary sheet for update)  Subjective functional status/changes:   [] No changes reported  The patient states that her knee is quite achy today     OBJECTIVE    Modality rationale: PD   Min Type Additional Details    [] Estim:  []Unatt       []IFC  []Premod                        []Other:  []w/ice   []w/heat  Position:  Location:    [] Estim: []Att    []TENS instruct  []NMES                    []Other:  []w/US   []w/ice   []w/heat  Position:  Location:    []  Traction: [] Cervical       []Lumbar                       [] Prone          []Supine                       []Intermittent   []Continuous Lbs:  [] before manual  [] after manual    []  Ultrasound: []Continuous   [] Pulsed                           []1MHz   []3MHz W/cm2:  Location:    []  Iontophoresis with dexamethasone         Location: [] Take home patch   [] In clinic    []  Ice     []  heat  []  Ice massage  []  Laser   []  Anodyne Position:  Location:    []  Laser with stim  []  Other:  Position:  Location:    []  Vasopneumatic Device    []  Right     []  Left  Pre-treatment girth:  Post-treatment girth:  Measured at (location):  Pressure:       [] lo [] med [] hi   Temperature: [] lo [] med [] hi   [] Skin assessment post-treatment:  []intact []redness- no adverse reaction    []redness  adverse reaction:     8 min Therapeutic Exercise:  [] See flow sheet :   Rationale: increase ROM and increase strength to improve the patients ability to improve ADL ease. 9 min Neuromuscular Re-education:  []  See flow sheet :   Rationale: increase ROM, increase strength and improve coordination  to improve the patients ability to improve ADL ease. 12 min Manual Therapy:  Left knee tibiofemoral flexion mobs with patient in seated grade II   The manual therapy interventions were performed at a separate and distinct time from the therapeutic activities interventions. Rationale: decrease pain, increase ROM and increase tissue extensibility to improve ADL ease. With   [] TE   [] TA   [] neuro   [] other: Patient Education: [x] Review HEP    [] Progressed/Changed HEP based on:   [] positioning   [] body mechanics   [] transfers   [] heat/ice application    [] other:      Other Objective/Functional Measures:   Left knee flexion 70 degrees     Pain Level (0-10 scale) post treatment: 0/10    ASSESSMENT/Changes in Function: The patient continues to demonstrate slow flexion progress of her left knee. She arrived 13 minutes late to session due to transportation being late, thus session was had to be cut short. Patient will continue to benefit from skilled PT services to modify and progress therapeutic interventions, address functional mobility deficits, address ROM deficits, address strength deficits, analyze and address soft tissue restrictions, analyze and cue movement patterns, analyze and modify body mechanics/ergonomics, assess and modify postural abnormalities and instruct in home and community integration to attain remaining goals. []  See Plan of Care  []  See progress note/recertification  []  See Discharge Summary         Progress towards goals / Updated goals:  1. The patient will improve FOTO score to 55 to improve ease of ADLs.             PN: regressed to 25   2.  The patient will improve left knee flexion to 90 degrees to improve ease of transfers.              PN: Progressed to 75 degrees passively               EKFFHFV: Remains about 70 degrees 9/22/2021  3. The patient will demonstrate effective quad setting in order to progress stability in prep for WB.              PN: progressing, fair quad set; requires brace locked to perform controlled SLR.                VYSUWPL: 9/15/2021 - Progressing - fair quad setting without e-stim, brace required for SLR  4. The patient will demonstrate active left knee extension to 0 degrees to improve ease of ambulation efficiency.                PN: near met, pt lacking 1 deg of left knee ext              Current: no change 9/20/21 Pt lacking 1 deg of left knee ext    PLAN  []  Upgrade activities as tolerated     [x]  Continue plan of care  []  Update interventions per flow sheet       []  Discharge due to:_  []  Other:_      Juan Wallace, PT 9/22/2021  8:14 AM    Future Appointments   Date Time Provider Sol Ulrich   9/24/2021 12:00 PM Joanie Null, PTA [facility]   9/27/2021 12:00 PM Joanie Null, PTA [facility]   9/29/2021 11:30 AM Max Goddard, PT [facility]

## 2021-09-24 ENCOUNTER — HOSPITAL ENCOUNTER (OUTPATIENT)
Dept: PHYSICAL THERAPY | Age: 46
Discharge: HOME | End: 2021-09-24
Payer: MEDICAID

## 2021-09-24 PROCEDURE — 97110 THERAPEUTIC EXERCISES: CPT

## 2021-09-24 PROCEDURE — 97112 NEUROMUSCULAR REEDUCATION: CPT

## 2021-09-24 NOTE — PROGRESS NOTES
PT DAILY TREATMENT NOTE     Patient Name: Adelita Pascual  Date:2021  : 1975  [x]  Patient  Verified  Payor: Jo Ann Parra / Plan: [address] [facility] / Product Type: Managed Care Medicaid /    In time:11:58  Out time:12:36  Total Treatment Time (min): 38  Visit #: 6 of 18    Medicare/BCBS Only   Total Timed Codes (min):  38 1:1 Treatment Time:  38       Treatment Area: Left leg pain [M79.605]    SUBJECTIVE  Pain Level (0-10 scale): 8  Any medication changes, allergies to medications, adverse drug reactions, diagnosis change, or new procedure performed?: [x] No    [] Yes (see summary sheet for update)  Subjective functional status/changes:   [] No changes reported  Pt reports she slept without her brace on last night and she has a lot of pain today. Pt states she thinks she may have moved wrong a few times in her sleep. OBJECTIVE      16 min Therapeutic Exercise:  [x] See flow sheet :   Rationale: increase ROM and increase strength to improve the patients ability to perform functional task with ease. 12 min Neuromuscular Re-education:  [x]  See flow sheet :   Rationale: increase strength, improve coordination, improve balance and increase proprioception  to improve the patients ability to perform ADL's with ease. 10 min Manual Therapy:   PROM to left knee, Tib fem mobs grade II-III to improve knee flexion    The manual therapy interventions were performed at a separate and distinct time from the therapeutic activities interventions. Rationale: decrease pain, increase ROM and increase tissue extensibility to improve functional mobility with ambulation ADL's.       With   [] TE   [] TA   [] neuro   [] other: Patient Education: [x] Review HEP    [] Progressed/Changed HEP based on:   [] positioning   [] body mechanics   [] transfers   [] heat/ice application    [] other:      Other Objective/Functional Measures:   Ely stretch- 2x30\"     Pain Level (0-10 scale) post treatment: 3-4/10    ASSESSMENT/Changes in Function:   Pt challenged with therex this visit displaying limited mobility with therex due to increased pain and stiffness in the left knee. Quad stretch initiated this visit to continue to improve left knee ROM with pt displaying some apprehension to laying prone however completing the stretch as prescribed. Pt reports  pain and stiffness following manual and left in no apparent distress. Patient will continue to benefit from skilled PT services to modify and progress therapeutic interventions, address functional mobility deficits, address ROM deficits, address strength deficits, analyze and address soft tissue restrictions, analyze and cue movement patterns, analyze and modify body mechanics/ergonomics and assess and modify postural abnormalities to attain remaining goals. [x]  See Plan of Care  []  See progress note/recertification  []  See Discharge Summary         Progress towards goals / Updated goals:  1. The patient will improve FOTO score to 55 to improve ease of ADLs.             PN: regressed to 25   2. The patient will improve left knee flexion to 90 degrees to improve ease of transfers.              PN: Progressed to 75 degrees passively               Current: Remains about 70 degrees 2021  3. The patient will demonstrate effective quad setting in order to progress stability in prep for WB.              PN: progressing, fair quad set; requires brace locked to perform controlled SLR.                NFIMLBQ: 9/15/2021 - Progressing - fair quad setting without e-stim, brace required for SLR  4. The patient will demonstrate active left knee extension to 0 degrees to improve ease of ambulation efficiency.                PN: near met, pt lacking 1 deg of left knee ext              FGLHUAN: no change 21 Pt lacking 1 deg of left knee ext  PLAN  []  Upgrade activities as tolerated     [x]  Continue plan of care  []  Update interventions per flow sheet       []  Discharge due to:_  []  Other:_      Racheal Chen, NACHO 9/24/2021  12:37 PM    Future Appointments   Date Time Provider Sol Ulrich   9/27/2021 12:00 PM Rohit Orr, PTA [facility]   9/29/2021 11:30 AM Quan Goddard, PT [facility]

## 2021-09-27 ENCOUNTER — HOSPITAL ENCOUNTER (OUTPATIENT)
Dept: PHYSICAL THERAPY | Age: 46
Discharge: HOME | End: 2021-09-27
Payer: MEDICAID

## 2021-09-27 PROCEDURE — 97110 THERAPEUTIC EXERCISES: CPT

## 2021-09-27 PROCEDURE — 97112 NEUROMUSCULAR REEDUCATION: CPT

## 2021-09-27 NOTE — PROGRESS NOTES
PT DAILY TREATMENT NOTE     Patient Name: Dank Code  Date:2021  : 1975  [x]  Patient  Verified  Payor: Rafiq Navarro / Plan: [address] [facility] / Product Type: Managed Care Medicaid /    In time:12:05  Out time:12:44  Total Treatment Time (min): 39  Visit #: 7 of 18    Treatment Area: Left leg pain [M79.605]    SUBJECTIVE  Pain Level (0-10 scale): 4-5  Any medication changes, allergies to medications, adverse drug reactions, diagnosis change, or new procedure performed?: [x] No    [] Yes (see summary sheet for update)  Subjective functional status/changes:   [] No changes reported  Pt reports she did a lot of walking over the weekend and her pain is not too high but she is feeling it a little bit. OBJECTIVE      17 min Therapeutic Exercise:  [x] See flow sheet :   Rationale: increase ROM and increase strength to improve the patients ability to perform functional task with ease. 12 min Neuromuscular Re-education:  [x]  See flow sheet :   Rationale: increase strength, improve coordination, improve balance and increase proprioception  to improve the patients ability to perform ADL's with ease. 10 min Manual Therapy:  PROM to left knee, Tib fem mobs grade II-III to improve knee flexion   The manual therapy interventions were performed at a separate and distinct time from the therapeutic activities interventions. Rationale: decrease pain, increase ROM and increase tissue extensibility to improve functional mobility with ambulation ADL's.           With   [] TE   [] TA   [] neuro   [] other: Patient Education: [x] Review HEP    [] Progressed/Changed HEP based on:   [] positioning   [] body mechanics   [] transfers   [] heat/ice application    [] other:      Other Objective/Functional Measures:      Pain Level (0-10 scale) post treatment: 1    ASSESSMENT/Changes in Function:   Pt continues to show slow progress with right knee flexion and displays a hard end feel noted during manual. SLR performed with no brace with continued quad lag and notable tightness at the distal quad however pt able to perform 12 reps with no increasing pain. Patient will continue to benefit from skilled PT services to modify and progress therapeutic interventions, address functional mobility deficits, address ROM deficits, address strength deficits, analyze and address soft tissue restrictions, analyze and cue movement patterns, analyze and modify body mechanics/ergonomics and assess and modify postural abnormalities to attain remaining goals. [x]  See Plan of Care  []  See progress note/recertification  []  See Discharge Summary         Progress towards goals / Updated goals:  1. The patient will improve FOTO score to 55 to improve ease of ADLs.             PN: regressed to 25   2. The patient will improve left knee flexion to 90 degrees to improve ease of transfers.              PN: Progressed to 75 degrees passively               Current: Remains about 70 degrees 9/22/2021  3. The patient will demonstrate effective quad setting in order to progress stability in prep for WB.              PN: progressing, fair quad set; requires brace locked to perform controlled SLR.                MFYKUKB: 9/15/2021 - Progressing - fair quad setting without e-stim, brace required for SLR  4. The patient will demonstrate active left knee extension to 0 degrees to improve ease of ambulation efficiency.                PN: near met, pt lacking 1 deg of left knee ext              Current: no change 9/20/21 Pt lacking 1 deg of left knee ext    PLAN  []  Upgrade activities as tolerated     [x]  Continue plan of care  []  Update interventions per flow sheet       []  Discharge due to:_  []  Other:_      Sally Geronimo PTA 9/27/2021  12:06 PM    Future Appointments   Date Time Provider Sol Ulrich   9/29/2021 11:30 AM Olegario POZO, PT [facility]

## 2021-09-29 ENCOUNTER — HOSPITAL ENCOUNTER (OUTPATIENT)
Dept: PHYSICAL THERAPY | Age: 46
Discharge: HOME | End: 2021-09-29
Payer: MEDICAID

## 2021-09-29 PROCEDURE — 97110 THERAPEUTIC EXERCISES: CPT

## 2021-09-29 PROCEDURE — 97112 NEUROMUSCULAR REEDUCATION: CPT

## 2021-09-29 NOTE — PROGRESS NOTES
[facility]  [address]  [place], [address]  [phone] fax    Physical Therapy Progress Note  Patient name: Lonnie Brian Start of Care: 2021   Referral source: Maria Eugenia Soliman, [id number] : 1975   Medical/Treatment Diagnosis: Left leg pain [M79.605]  Payor: [facility] MEDICAID / Plan: Fitz Cords / Product Type: Managed Care Medicaid /  Onset Date:2021      Prior Hospitalization: see medical history Provider#: [id number]   Medications: Verified on Patient Summary List     Comorbidities: HTN, asthma, left knee ORIF   Prior Level of Function: The patient was ambulatory independently. Visits from Start of Care: 17   Missed Visits: 2    Key Functional Changes:   1. The patient will improve FOTO score to 55 to improve ease of ADLs.             YN: regressed to 25              Current: slight regression 21 2021   2. The patient will improve left knee flexion to 90 degrees to improve ease of transfers.              PN: Progressed to 75 degrees passively               Current: Regression to 70 degrees 2021  3. The patient will demonstrate effective quad setting in order to progress stability in prep for WB.              PN: progressing, fair quad set; requires brace locked to perform controlled SLR.                MIYICXS: Remains limited noting lag, but improving 2021  4. The patient will demonstrate active left knee extension to 0 degrees to improve ease of ambulation efficiency.                PN: near met, pt lacking 1 deg of left knee ext              Current: Remains about lacking about 1 degrees. 2021    Updated Goals: to be achieved in 4 weeks:   1. The patient will improve FOTO score to 55 to improve ease of ADLs.             PX: slight regression 21 2021    2.  The patient will improve left knee flexion to 90 degrees to improve ease of transfers.              PN: Regression to 70 degrees 9/29/2021   3. The patient demonstrate 50% weight bearing (upon clearance from MD to improve ease of ambulation. PN: currently toe touch        ASSESSMENT/RECOMMENDATIONS:  [x]Continue therapy per initial plan/protocol at a frequency of  2 x per week for 4 weeks  []Continue therapy with the following recommended changes:_____________________      _____________________________________________________________________  []Discontinue therapy progressing towards or have reached established goals  []Discontinue therapy due to lack of appreciable progress towards goals  []Discontinue therapy due to lack of attendance or compliance  []Await Physician's recommendations/decisions regarding therapy  []Other:________________________________________________________________    Thank you for this referral.    Hunter Tavares, PT 9/29/2021 12:46 PM  NOTE TO PHYSICIAN:  Niya He [address]   [facility]: [phone]  If you are unable to process this request in 24 hours please contact our office: [phone] I have read the above report and request that my patient continue as recommended. ? I have read the above report and request that my patient continue therapy with the following changes/special instructions:__________________________________________________________  ? I have read the above report and request that my patient be discharged from therapy.     Physicians signature: ______________________________Date: ______Time:______     GENET Foster

## 2021-09-29 NOTE — PROGRESS NOTES
PT DAILY TREATMENT NOTE     Patient Name: Elian Osorio  Date:2021  : 1975  [x]  Patient  Verified  Payor: Jolynn Cassidy Sevene / Plan: Sara Valente / Product Type: Managed Care Medicaid /    In time:11:38  Out time:12:20  Total Treatment Time (min): 42  Visit #: 7 of 8    Treatment Area: Left leg pain [M79.605]    SUBJECTIVE  Pain Level (0-10 scale): 1-2/10  Any medication changes, allergies to medications, adverse drug reactions, diagnosis change, or new procedure performed?: [x] No    [] Yes (see summary sheet for update)  Subjective functional status/changes:   [] No changes reported  The patient reports she continues to try to maintain weight bearing status, but her knee is still tight. OBJECTIVE  18 min Therapeutic Exercise:  [x] See flow sheet :   Rationale: increase ROM and increase strength to improve the patients ability to improve ADL ease. 12 min Neuromuscular Re-education:  [x]  See flow sheet :   Rationale: increase ROM and increase strength  to improve the patients ability to improve ADL ease. 12 min Manual Therapy:  PROM of left knee tibiofemoral flexion mobs grade III to IV; inferior mobs of patella with tibiofemoral flexion grade II to III, scar mobs   The manual therapy interventions were performed at a separate and distinct time from the therapeutic activities interventions. Rationale: decrease pain, increase ROM and increase tissue extensibility to improve ADL ease. With   [] TE   [] TA   [] neuro   [] other: Patient Education: [x] Review HEP    [] Progressed/Changed HEP based on:   [] positioning   [] body mechanics   [] transfers   [] heat/ice application    [] other:      Other Objective/Functional Measures:   AROM left knee: 70 degrees  FOTO: 21     SLR: lag remains due to quad weakness, though improving.      Pain Level (0-10 scale) post treatment: 0/10    ASSESSMENT/Changes in Function: The patient has demonstrated slow progress regarding her left knee flexion over the past few weeks continuing to note about 70 degrees of flexion. She is attempting to follow up with MD this month in order to ascertain weight bearing status changes, as she has remained toe touch weight bearing. PT continues to caution her regarding importance of adhering to this precaution. She has a chief complaint of stiffness today upon presentation, but does indicate     Patient will continue to benefit from skilled PT services to modify and progress therapeutic interventions, address functional mobility deficits, address ROM deficits, address strength deficits, analyze and address soft tissue restrictions, analyze and cue movement patterns, analyze and modify body mechanics/ergonomics, assess and modify postural abnormalities and instruct in home and community integration to attain remaining goals. []  See Plan of Care  []  See progress note/recertification  []  See Discharge Summary         Progress towards goals / Updated goals:  1. The patient will improve FOTO score to 55 to improve ease of ADLs.             ZP: regressed to 25   Current: slight regression 21 9/29/2021   2. The patient will improve left knee flexion to 90 degrees to improve ease of transfers.              PN: Progressed to 75 degrees passively               Current: Regression to 70 degrees 9/29/2021  3. The patient will demonstrate effective quad setting in order to progress stability in prep for WB.              PN: progressing, fair quad set; requires brace locked to perform controlled SLR.                GOQRLJS: Remains limited noting lag, but improving 9/29/2021  4. The patient will demonstrate active left knee extension to 0 degrees to improve ease of ambulation efficiency.                PN: near met, pt lacking 1 deg of left knee ext              Current: Remains about lacking about 1 degrees.  9/29/2021    PLAN  []  Upgrade activities as tolerated     [x]  Continue plan of care  [] Update interventions per flow sheet       []  Discharge due to:_  []  Other:_      Hunter Tavares, PT 9/29/2021  11:40 AM    No future appointments.

## 2021-10-04 ENCOUNTER — APPOINTMENT (OUTPATIENT)
Dept: PHYSICAL THERAPY | Age: 46
End: 2021-10-04
Payer: MEDICAID

## 2021-10-06 ENCOUNTER — APPOINTMENT (OUTPATIENT)
Dept: PHYSICAL THERAPY | Age: 46
End: 2021-10-06
Payer: MEDICAID

## 2021-10-08 ENCOUNTER — APPOINTMENT (OUTPATIENT)
Dept: PHYSICAL THERAPY | Age: 46
End: 2021-10-08
Payer: MEDICAID

## 2021-10-13 ENCOUNTER — HOSPITAL ENCOUNTER (OUTPATIENT)
Dept: PHYSICAL THERAPY | Age: 46
Discharge: HOME | End: 2021-10-13
Payer: MEDICAID

## 2021-10-13 PROCEDURE — 97112 NEUROMUSCULAR REEDUCATION: CPT

## 2021-10-13 PROCEDURE — 97110 THERAPEUTIC EXERCISES: CPT

## 2021-10-13 NOTE — PROGRESS NOTES
PT DAILY TREATMENT NOTE     Patient Name: Leora Mccoy  Date:10/13/2021  : 1975  [x]  Patient  Verified  Payor: [address] / Plan: [address] [facility] / Product Type: Managed Care Medicaid /    In time:2:15  Out time:3:09  Total Treatment Time (min): 54  Visit #: 1 of 8       Treatment Area: Left leg pain [M79.605]    SUBJECTIVE  Pain Level (0-10 scale): 9  Any medication changes, allergies to medications, adverse drug reactions, diagnosis change, or new procedure performed?: [x] No    [] Yes (see summary sheet for update)  Subjective functional status/changes:   [] No changes reported  Pt reports she has been performing her HEP a lot more and she feels she may have over done it some because she is feeling a lot of pain in her left leg and knee. OBJECTIVE    Modality rationale: decrease edema, decrease inflammation and decrease pain to improve the patients ability to perform functional task with ease.    Min Type Additional Details    [] Estim:  []Unatt       []IFC  []Premod                        []Other:  []w/ice   []w/heat  Position:  Location:    [] Estim: []Att    []TENS instruct  []NMES                    []Other:  []w/US   []w/ice   []w/heat  Position:  Location:    []  Traction: [] Cervical       []Lumbar                       [] Prone          []Supine                       []Intermittent   []Continuous Lbs:  [] before manual  [] after manual    []  Ultrasound: []Continuous   [] Pulsed                           []1MHz   []3MHz W/cm2:  Location:    []  Iontophoresis with dexamethasone         Location: [] Take home patch   [] In clinic   10 [x]  Ice     []  heat  []  Ice massage  []  Laser   []  Anodyne Position: long sit  Location: Left knee    []  Laser with stim  []  Other:  Position:  Location:    []  Vasopneumatic Device    []  Right     []  Left  Pre-treatment girth:  Post-treatment girth:  Measured at (location):  Pressure:       [] lo [] med [] hi   Temperature: [] lo [] med [] hi   [] Skin assessment post-treatment:  []intact []redness- no adverse reaction    []redness  adverse reaction:         20 min Therapeutic Exercise:  [x] See flow sheet :   Rationale: increase ROM and increase strength to improve the patients ability to perform functional task with ease. 12 min Neuromuscular Re-education:  [x]  See flow sheet :   Rationale: increase strength, improve coordination, improve balance and increase proprioception  to improve the patients ability to perform ADL's with ease. 12 min Manual Therapy:  PROM of left knee tibiofemoral flexion mobs grade III to IV; inferior mobs of patella with tibiofemoral flexion grade II to III, scar mobs   The manual therapy interventions were performed at a separate and distinct time from the therapeutic activities interventions. Rationale: decrease pain, increase ROM, increase tissue extensibility and decrease edema  to improve functional mobility with ambulation ADL's. With   [] TE   [] TA   [] neuro   [] other: Patient Education: [x] Review HEP    [] Progressed/Changed HEP based on:   [] positioning   [] body mechanics   [] transfers   [] heat/ice application    [] other:      Other Objective/Functional Measures:      Pain Level (0-10 scale) post treatment: 0    ASSESSMENT/Changes in Function:   Pt remains at 70 deg of AROM left knee flexion noting increased soreness and stiffness this visit from possibly over performing her HEP. Improved discomfort following manual with pt reporting decreased tightness in the left knee. Pt reports she has a surgery appointment on 10/20/21 to get a cyst removed ( not in relation to her left knee) but this surgery will cause her to be out of therapy treatment for a while. Pt reports decreased pain following cold modality.     Patient will continue to benefit from skilled PT services to modify and progress therapeutic interventions, address functional mobility deficits, address ROM deficits, address strength deficits, analyze and address soft tissue restrictions, analyze and cue movement patterns, analyze and modify body mechanics/ergonomics and assess and modify postural abnormalities to attain remaining goals. [x]  See Plan of Care  []  See progress note/recertification  []  See Discharge Summary         Progress towards goals / Updated goals:  Updated Goals: to be achieved in 4 weeks:              1. The patient will improve FOTO score to 55 to improve ease of ADLs.             PN: slight regression 21 9/29/2021               2. The patient will improve left knee flexion to 90 degrees to improve ease of transfers.              PN: Regression to 70 degrees 9/29/2021              3. The patient demonstrate 50% weight bearing (upon clearance from MD to improve ease of ambulation.               PN: currently toe touch    PLAN  []  Upgrade activities as tolerated     [x]  Continue plan of care  []  Update interventions per flow sheet       []  Discharge due to:_  []  Other:_      Leigh Ann Azul PTA 10/13/2021  2:11 PM    Future Appointments   Date Time Provider Sol Ulrich   10/13/2021  2:15 PM Ann-Marie Proctor PTA [facility]   10/15/2021  1:30 PM Lamont Calle, PT [facility]   10/22/2021  9:15 AM Matti Grier, PT [facility]   10/25/2021  3:00 PM Ann-Marie Proctor PTA [facility]   10/27/2021 12:45 PM Alhaji Canales, PT [facility]

## 2021-10-15 ENCOUNTER — HOSPITAL ENCOUNTER (OUTPATIENT)
Dept: PHYSICAL THERAPY | Age: 46
Discharge: HOME | End: 2021-10-15
Payer: MEDICAID

## 2021-10-15 PROCEDURE — 97112 NEUROMUSCULAR REEDUCATION: CPT

## 2021-10-15 PROCEDURE — 97110 THERAPEUTIC EXERCISES: CPT

## 2021-10-15 PROCEDURE — 97530 THERAPEUTIC ACTIVITIES: CPT

## 2021-10-15 NOTE — PROGRESS NOTES
PT DAILY TREATMENT NOTE 10-18    Patient Name: Maxime Armendariz  Date:10/15/2021  : 1975  [x]  Patient  Verified  Payor: [address] / Plan: [address] [facility] / Product Type: Managed Care Medicaid /    In time:132  Out time:209  Total Treatment Time (min): 37  Visit #: 2 of 8      Treatment Area: Left leg pain [M79.605]    SUBJECTIVE  Pain Level (0-10 scale): 3  Any medication changes, allergies to medications, adverse drug reactions, diagnosis change, or new procedure performed?: [x] No    [] Yes (see summary sheet for update)  Subjective functional status/changes:   [] No changes reported  It's feeling better today. OBJECTIVE        11 min Therapeutic Exercise:  [] See flow sheet :   Rationale: increase ROM and increase strength to improve the patients ability to perform daily activities      8 min Neuromuscular Re-education:  []  See flow sheet :   Rationale: increase strength and increase proprioception  to improve the patients ability to recruit quads for TKE for ambulation  8 min Therapeutic Activity:  []  See flow sheet :   Rationale: increase ROM and increase strength  to improve the patients ability to perform daily activities     10 min Manual Therapy:  Grade 4 left patellar mobs/ PROM ; grade 4 tib/fib mobs   The manual therapy interventions were performed at a separate and distinct time from the therapeutic activities interventions. Rationale: decrease pain, increase ROM and increase tissue extensibility to improve mobility for ADLs  With   [] TE   [] TA   [] neuro   [] other: Patient Education: [x] Review HEP    [] Progressed/Changed HEP based on:   [] positioning   [] body mechanics   [] transfers   [] heat/ice application    [] other:      Other Objective/Functional Measures: left knee passive flexion 75     Pain Level (0-10 scale) post treatment: 0    ASSESSMENT/Changes in Function: Slight increase in PROM, but has hard end-feel 2/2 scar tissue build up.   Unable to progress patient as doctor has not seen patient in over a month to update protocol. Patient will continue to benefit from skilled PT services to modify and progress therapeutic interventions, address functional mobility deficits, address ROM deficits, address strength deficits, analyze and address soft tissue restrictions and analyze and cue movement patterns to attain remaining goals. []  See Plan of Care  []  See progress note/recertification  []  See Discharge Summary         Progress towards goals / Updated goals:   1. The patient will improve FOTO score to 55 to improve ease of ADLs.             RN: slight regression 21 9/29/2021               2. The patient will improve left knee flexion to 90 degrees to improve ease of transfers.              PN: Regression to 70 degrees 9/29/2021   Current: 75 10/15/21              3.  The patient demonstrate 50% weight bearing (upon clearance from MD to improve ease of ambulation.              PN: currently toe touch    PLAN  [x]  Upgrade activities as tolerated     []  Continue plan of care  []  Update interventions per flow sheet       []  Discharge due to:_  []  Other:_      Ralph Chen, MICHELE, CMTPT 10/15/2021  9:41 AM    Future Appointments   Date Time Provider Sol Ulrich   10/15/2021  1:30 PM Sarah Crar, PT [facility]   10/18/2021  3:45 PM Alex Barajas, PT [facility]   10/22/2021  9:15 AM Alex Barajas, PT [facility]   10/25/2021  3:00 PM Rose Marie Goldberg, PTA [facility]   10/27/2021 12:45 PM Frederick Anderson, PT [facility]

## 2021-10-18 ENCOUNTER — HOSPITAL ENCOUNTER (OUTPATIENT)
Dept: PHYSICAL THERAPY | Age: 46
Discharge: HOME | End: 2021-10-18
Payer: MEDICAID

## 2021-10-18 PROCEDURE — 97112 NEUROMUSCULAR REEDUCATION: CPT

## 2021-10-18 PROCEDURE — 97530 THERAPEUTIC ACTIVITIES: CPT

## 2021-10-18 PROCEDURE — 97110 THERAPEUTIC EXERCISES: CPT

## 2021-10-18 NOTE — PROGRESS NOTES
PT DAILY TREATMENT NOTE     Patient Name: Luis Gage  Date:10/18/2021  : 1975  [x]  Patient  Verified  Payor: [address] / Plan: [address] [facility] / Product Type: Managed Care Medicaid /    In time:3:47  Out time:4:30  Total Treatment Time (min): 43  Visit #: 3 of 8      Treatment Area: Left leg pain [M79.605]    SUBJECTIVE  Pain Level (0-10 scale): 2-3/10  Any medication changes, allergies to medications, adverse drug reactions, diagnosis change, or new procedure performed?: [x] No    [] Yes (see summary sheet for update)  Subjective functional status/changes:   [] No changes reported  The patient reports that she has her 's appointment tomorrow and is anxiously awaiting due to hoping     OBJECTIVE  10 min Therapeutic Exercise:  [x] See flow sheet :   Rationale: increase ROM and increase strength to improve the patients ability to improve ADL ease. 15 min Neuromuscular Re-education:  [x]  See flow sheet :   Rationale: improve coordination, improve balance and increase proprioception  to improve the patients ability to improve ADL ease. 8 min Therapeutic Activity:  [x]  See flow sheet :   Rationale: increase ROM and increase strength  to improve the patients ability to improve ADL ease. 10 min Manual Therapy:  Right knee tibiofemoral - left knee flexion/extension mobs    The manual therapy interventions were performed at a separate and distinct time from the therapeutic activities interventions. Rationale: decrease pain, increase ROM and increase tissue extensibility to improve ADL ease.           With   [] TE   [] TA   [] neuro   [] other: Patient Education: [x] Review HEP    [] Progressed/Changed HEP based on:   [] positioning   [] body mechanics   [] transfers   [] heat/ice application    [] other:      Other Objective/Functional Measures:   Left tibiofemoral flexion: 80 degrees     Pain Level (0-10 scale) post treatment: 0/10    ASSESSMENT/Changes in Function: The patient has improved her right knee flexion to 80 degrees to improve ease of ADLs. She is due for follow-up tomorrow with hopes of progressing weight bearing status as well as program in PT following this visit. She leaves in no pain with all questions answered. Patient will continue to benefit from skilled PT services to modify and progress therapeutic interventions, address functional mobility deficits, address ROM deficits, address strength deficits, analyze and address soft tissue restrictions, analyze and cue movement patterns, analyze and modify body mechanics/ergonomics, assess and modify postural abnormalities and instruct in home and community integration to attain remaining goals. []  See Plan of Care  []  See progress note/recertification  []  See Discharge Summary         Progress towards goals / Updated goals:  1. The patient will improve FOTO score to 55 to improve ease of ADLs.             NS: slight regression 21 9/29/2021               2. The patient will improve left knee flexion to 90 degrees to improve ease of transfers.              PN: Regression to 70 degrees 9/29/2021              Current: Progressed to 80 degrees 10/18/2021              3.  The patient demonstrate 50% weight bearing (upon clearance from MD to improve ease of ambulation.              PN: currently toe touch   Current: No change- remains toe touch  PLAN  []  Upgrade activities as tolerated     [x]  Continue plan of care  []  Update interventions per flow sheet       []  Discharge due to:_  []  Other:_      Darlene Mayo, PT 10/18/2021  4:10 PM    Future Appointments   Date Time Provider Sol Ulrich   10/22/2021  9:15 AM Ruthann Martin, PT [facility]   10/25/2021  3:00 PM Diamond Kerns PTA [facility]   10/27/2021 12:45 PM Capo Feng PT [facility]   11/1/2021 12:45 PM Diamond Kerns PTA [facility]   11/3/2021 12:45 PM Capo Feng, PT [facility]

## 2021-10-25 ENCOUNTER — HOSPITAL ENCOUNTER (OUTPATIENT)
Dept: PHYSICAL THERAPY | Age: 46
Discharge: HOME | End: 2021-10-25
Payer: MEDICAID

## 2021-10-25 PROCEDURE — 97530 THERAPEUTIC ACTIVITIES: CPT

## 2021-10-25 PROCEDURE — 97112 NEUROMUSCULAR REEDUCATION: CPT

## 2021-10-25 PROCEDURE — 97110 THERAPEUTIC EXERCISES: CPT

## 2021-10-25 NOTE — PROGRESS NOTES
PT DAILY TREATMENT NOTE     Patient Name: Nima Calle  Date:10/25/2021  : 1975  [x]  Patient  Verified  Payor: [employer] / Plan: [address] [facility] / Product Type: Managed Care Medicaid /    In time:3:02  Out time: 3:42  Total Treatment Time (min): 40  Visit #: 4 of 8    Treatment Area: Left leg pain [M79.605]    SUBJECTIVE  Pain Level (0-10 scale): 2  Any medication changes, allergies to medications, adverse drug reactions, diagnosis change, or new procedure performed?: [x] No    [] Yes (see summary sheet for update)  Subjective functional status/changes:   [] No changes reported  Pt reports she had a f/u appointment with her MD and was cleared to go back to work    OBJECTIVE      10 min Therapeutic Exercise:  [x] See flow sheet :   Rationale: increase ROM and increase strength to improve the patients ability to perform functional task with ease. 14 min Therapeutic Activity:  [x]  See flow sheet :   Rationale: increase strength and improve coordination  to improve the patients ability to perform daily task with ease. 8 min Neuromuscular Re-education:  [x]  See flow sheet :   Rationale: increase strength, improve coordination, improve balance and increase proprioception  to improve the patients ability to perform ADL's with ease. 8 min Manual Therapy:  Right knee tibiofemoral - left knee flexion/extension mobs    The manual therapy interventions were performed at a separate and distinct time from the therapeutic activities interventions. Rationale: decrease pain, increase ROM and increase tissue extensibility to improve functional mobility with ambulation ADL's.     With   [] TE   [] TA   [] neuro   [] other: Patient Education: [x] Review HEP    [] Progressed/Changed HEP based on:   [] positioning   [] body mechanics   [] transfers   [] heat/ice application    [] other:      Other Objective/Functional Measures:      Pain Level (0-10 scale) post treatment: 0    ASSESSMENT/Changes in Function:   Pt saw her MD last week and was cleared to go back to work starting Nov 1st. Pt was also cleared to discharge her knee brace as tolerated and presented to treatment today with no brace but utilizing B crutches. MD also cleared patient to increase WB status by 25lb and to increase by 25lb's every week over the next month. Patient will continue to benefit from skilled PT services to modify and progress therapeutic interventions, address functional mobility deficits, address ROM deficits, address strength deficits, analyze and address soft tissue restrictions, analyze and cue movement patterns, analyze and modify body mechanics/ergonomics and assess and modify postural abnormalities to attain remaining goals. [x]  See Plan of Care  []  See progress note/recertification  []  See Discharge Summary         Progress towards goals / Updated goals:  1. The patient will improve FOTO score to 55 to improve ease of ADLs.             OR: slight regression 21 9/29/2021               2. The patient will improve left knee flexion to 90 degrees to improve ease of transfers.              PN: Regression to 70 degrees 9/29/2021              Current: Progressed to 80 degrees 10/18/2021              3.  The patient demonstrate 50% weight bearing (upon clearance from MD to improve ease of ambulation.              PN: currently toe touch              Current: progressing 10/25/21 - partial progressive weightbearing - increasing 25lb per week    PLAN  []  Upgrade activities as tolerated     [x]  Continue plan of care  []  Update interventions per flow sheet       []  Discharge due to:_  []  Other:_      Amie Arciniega PTA 10/25/2021  3:08 PM    Future Appointments   Date Time Provider Sol Ulrich   10/27/2021 12:45 PM Abraham Oppenheim, PT [facility]   11/1/2021 12:45 PM [facility]   11/3/2021 12:45 PM Abraham Oppenheim, PT [facility]

## 2021-10-27 ENCOUNTER — HOSPITAL ENCOUNTER (OUTPATIENT)
Dept: PHYSICAL THERAPY | Age: 46
Discharge: HOME | End: 2021-10-27
Payer: MEDICAID

## 2021-10-27 PROCEDURE — 97110 THERAPEUTIC EXERCISES: CPT

## 2021-10-27 PROCEDURE — 97112 NEUROMUSCULAR REEDUCATION: CPT

## 2021-10-27 PROCEDURE — 97530 THERAPEUTIC ACTIVITIES: CPT

## 2021-10-27 NOTE — PROGRESS NOTES
[facility] [address] [address] [address]  [place], [address]  [phone] fax    Physical Therapy Progress Note  Patient name: Mar Miranda Start of Care: 2021   Referral source: Norma Riojas, [address] : 1975   Medical/Treatment Diagnosis: Left leg pain [M79.605]  Payor: Jolynn Moran / Plan: Francie Waters / Product Type: Managed Care Medicaid /  Onset Date:2021      Prior Hospitalization: see medical history Provider#: [id number]   Medications: Verified on Patient Summary List     Comorbidities: HTN, asthma, left knee ORIF   Prior Level of Function: The patient was ambulatory independently. Visits from Start of Care: 22    Missed Visits: 3      Established Goals:        Excellent         Good         Limited            None  [] Increased ROM   []  [x]  []  []  [] Increased Strength  []  [x]  []  []  [] Increased Mobility  []  [x]  []  []   [] Decreased Pain   []  [x]  []  []  [] Decreased Swelling  []  [x]  []  []    Key Functional Changes: Improved left knee ROM, clearance to increase weight bearing and progress functional activities    Updated Goals: to be achieved in 4 weeks:   1. The patient will improve FOTO score to 55 to improve ease of ADLs.             XJ:  ZLXS not assessed this session, will complete next follow up    2. The patient will improve left knee flexion to 90 degrees to improve ease of transfers.              PN:  84 degrees    3. The patient demonstrate 50% weight bearing (upon clearance from MD) to improve ease of ambulation.              PN: partial progressive weightbearing - increasing 25lb per week        ASSESSMENT/RECOMMENDATIONS: Patient making steady improvement in left knee AROM though still significantly limited. She has begun progressive increase in weight bearing per MD clearance of 25% each week. Patient has been highly compliant with clinic visits.  She would benefit from further physical therapy to introduce weight bearing and upright activities, gait training, and to continue progression in strengthening and ROM activities for return to pre-morbid level of function. [x]Continue therapy per initial plan/protocol at a frequency of  2 x per week for 4 weeks  []Continue therapy with the following recommended changes:_____________________      _____________________________________________________________________  []Discontinue therapy progressing towards or have reached established goals  []Discontinue therapy due to lack of appreciable progress towards goals  []Discontinue therapy due to lack of attendance or compliance  []Await Physician's recommendations/decisions regarding therapy  []Other:________________________________________________________________    Thank you for this referral.    Mika Hernandez, PT 10/27/2021 2:00 PM  NOTE TO PHYSICIAN:  PLEASE COMPLETE THE ORDERS BELOW AND   FAX TO [facility]: [phone]  If you are unable to process this request in 24 hours please contact our office: [phone]    ? I have read the above report and request that my patient continue as recommended. ? I have read the above report and request that my patient continue therapy with the following changes/special instructions:__________________________________________________________  ? I have read the above report and request that my patient be discharged from therapy.     Physicians signature: ______________________________Date: ______Time:______     GENET Patel

## 2021-10-27 NOTE — PROGRESS NOTES
Glenn Rodrigez PT DAILY TREATMENT NOTE     Patient Name: Eduarda Sherwood  Date:10/27/2021  : 1975  [x]  Patient  Verified  Payor: [address] / Plan: [address] [facility] / Product Type: Managed Care Medicaid /    In time: 12:53 PM  Out time: 1:38 PM  Total Treatment Time (min): 45  Visit #: 5 of 8    Treatment Area: Left leg pain [M79.605]    SUBJECTIVE  Pain Level (0-10 scale): 1  Any medication changes, allergies to medications, adverse drug reactions, diagnosis change, or new procedure performed?: [x] No    [] Yes (see summary sheet for update)  Subjective functional status/changes:   [] No changes reported  Patient reports minimal pain, mainly tightness    OBJECTIVE    Modality rationale: decrease pain to improve the patients ability to complete ADLs. Min Type Additional Details   10 [x]  Ice     []  heat  []  Ice massage  []  Laser   []  Anodyne Position:  Location:     13 min Therapeutic Exercise:  [x] See flow sheet :   Rationale: increase ROM and increase strength to improve the patients ability to complete ADLs. 8 min Therapeutic Activity:  -  See flow sheet :   Rationale: increase strength and improve coordination  to improve the patients ability to perform functional transfers with greater ease. 8 min Neuromuscular Re-education:  [x]  See flow sheet :   Rationale: increase strength, improve coordination and improve balance  to improve the patients ability to progress gait with greater stability and safety. 6 min Manual Therapy:    Right knee tibiofemoral - left knee flexion/extension mobs    The manual therapy interventions were performed at a separate and distinct time from the therapeutic activities interventions. Rationale: increase ROM and increase tissue extensibility to increase ease of sitting.             With   [] TE   [] TA   [] neuro   [] other: Patient Education: [x] Review HEP    [] Progressed/Changed HEP based on:   [] positioning   [] body mechanics [] transfers   [] heat/ice application    [] other:      Other Objective/Functional Measures: Addition of swiss ball double knee to chest to facilitate increased knee flexion   Left knee flexion 84 degrees    Pain Level (0-10 scale) post treatment: 0    ASSESSMENT/Changes in Function: Patient making steady improvement in left knee AROM though still significantly limited. She has begun progressive increase in weight bearing per MD clearance of 25% each week. Patient has been highly compliant with clinic visits. She would benefit from further physical therapy to introduce weight bearing and upright activities in addition to continue progression in strengthening and ROM activities. Patient will continue to benefit from skilled PT services to modify and progress therapeutic interventions, address functional mobility deficits, address ROM deficits, address strength deficits, analyze and address soft tissue restrictions and analyze and cue movement patterns to attain remaining goals. []  See Plan of Care  [x]  See progress note/recertification  []  See Discharge Summary         Progress towards goals / Updated goals:  1. The patient will improve FOTO score to 55 to improve ease of ADLs.             KY: slight regression 21 9/29/2021    Current: 10/27/2021 -  FOTO not assessed this session, will complete next follow up  2. The patient will improve left knee flexion to 90 degrees to improve ease of transfers.              PN: Regression to 70 degrees 9/29/2021              Current: 10/27/2021 - Progressing - 84 degrees  3.  The patient demonstrate 50% weight bearing (upon clearance from MD to improve ease of ambulation.              PN: currently toe touch              Current: 10/25/2021 - Progressing -  partial progressive weightbearing - increasing 25lb per week       PLAN  [x]  Upgrade activities as tolerated     []  Continue plan of care  []  Update interventions per flow sheet       []  Discharge due to:_  []  Other:_      Lindsey Gaines, PT 10/27/2021  12:59 PM    Future Appointments   Date Time Provider Sol Ulrich   11/1/2021 12:45 PM Donnie Daniel, PTA [facility]   11/3/2021 12:45 PM Ernesto Garcia, PT [facility]

## 2021-11-01 ENCOUNTER — HOSPITAL ENCOUNTER (OUTPATIENT)
Dept: PHYSICAL THERAPY | Age: 46
Discharge: HOME | End: 2021-11-01
Payer: MEDICAID

## 2021-11-01 PROCEDURE — 97110 THERAPEUTIC EXERCISES: CPT

## 2021-11-01 PROCEDURE — 97112 NEUROMUSCULAR REEDUCATION: CPT

## 2021-11-01 PROCEDURE — 97530 THERAPEUTIC ACTIVITIES: CPT

## 2021-11-01 NOTE — PROGRESS NOTES
PT DAILY TREATMENT NOTE     Patient Name: Bindu Best  Date:2021  : 1975  [x]  Patient  Verified  Payor: [address] / Plan: [address] [facility] / Product Type: Managed Care Medicaid /    In time:12:48  Out time: 1:46  Total Treatment Time (min): 58  Visit #: 1 of 8    Treatment Area: Left leg pain [M79.415]    SUBJECTIVE  Pain Level (0-10 scale): 0  Any medication changes, allergies to medications, adverse drug reactions, diagnosis change, or new procedure performed?: [x] No    [] Yes (see summary sheet for update)  Subjective functional status/changes:   [] No changes reported  Pt reports she went out of town and had to sit in the car for an extended time and her left knee and leg are feeling really tight. OBJECTIVE    Modality rationale: decrease edema, decrease inflammation and decrease pain to improve the patients ability to tolerate post workout soreness.    Min Type Additional Details    [] Estim:  []Unatt       []IFC  []Premod                        []Other:  []w/ice   []w/heat  Position:  Location:    [] Estim: []Att    []TENS instruct  []NMES                    []Other:  []w/US   []w/ice   []w/heat  Position:  Location:    []  Traction: [] Cervical       []Lumbar                       [] Prone          []Supine                       []Intermittent   []Continuous Lbs:  [] before manual  [] after manual    []  Ultrasound: []Continuous   [] Pulsed                           []1MHz   []3MHz W/cm2:  Location:    []  Iontophoresis with dexamethasone         Location: [] Take home patch   [] In clinic   10 [x]  Ice     []  heat  []  Ice massage  []  Laser   []  Anodyne Position: longsit  Location: left knee    []  Laser with stim  []  Other:  Position:  Location:    []  Vasopneumatic Device    []  Right     []  Left  Pre-treatment girth:  Post-treatment girth:  Measured at (location):  Pressure:       [] lo [] med [] hi   Temperature: [] lo [] med [] hi   [] Skin assessment post-treatment:  []intact []redness- no adverse reaction    []redness  adverse reaction:       20 min Therapeutic Exercise:  [x] See flow sheet :   Rationale: increase ROM, increase strength and improve coordination to improve the patients ability to perform functional task with ease. 10 min Therapeutic Activity:  [x]  See flow sheet :   Rationale: increase strength and improve coordination  to improve the patients ability to perform daily task with ease. 10 min Neuromuscular Re-education:  [x]  See flow sheet :   Rationale: increase strength, improve coordination, improve balance and increase proprioception  to improve the patients ability to perform ambulation ADL's with ease. 8 min Manual Therapy:  Right knee tibiofemoral - left knee flexion/extension mobs. IASTM to left HS/gastroc/quad. The manual therapy interventions were performed at a separate and distinct time from the therapeutic activities interventions. Rationale: decrease pain, increase ROM, increase tissue extensibility and decrease trigger points to improve functional mobility with ambulation ADL's. With   [] TE   [] TA   [] neuro   [] other: Patient Education: [x] Review HEP    [] Progressed/Changed HEP based on:   [] positioning   [] body mechanics   [] transfers   [] heat/ice application    [] other:      Other Objective/Functional Measures:      Pain Level (0-10 scale) post treatment: 2    ASSESSMENT/Changes in Function:   HS stretching and gastroc stretching performed at the stairs this visit to continue to improve left LE mobility and tissue extensibility. Lateral weight shifting inititaed this visit to [facility] to improve WB'in tolerance with pt reporting no increasing left knee pain. Pt reports B hip pain especially with sleeping so hip x 3 with the left LE initiated this visit with pt performing with good form and no increased pain.  Continued treatment to increase Left knee strength and WB'ing tolerance per script to aid with ease of ambulation ADL's. Minor increase in soreness post treatment but pt left in no apparent distress. Patient will continue to benefit from skilled PT services to modify and progress therapeutic interventions, address functional mobility deficits, address ROM deficits, address strength deficits, analyze and address soft tissue restrictions, analyze and cue movement patterns, analyze and modify body mechanics/ergonomics and assess and modify postural abnormalities to attain remaining goals. [x]  See Plan of Care  []  See progress note/recertification  []  See Discharge Summary         Progress towards goals / Updated goals:      1. The patient will improve FOTO score to 55 to improve ease of ADLs.             VU:  FOTO not assessed this session, will complete next follow up     2. The patient will improve left knee flexion to 90 degrees to improve ease of transfers.              PN:  84 degrees     3.  The patient demonstrate 50% weight bearing (upon clearance from MD) to improve ease of ambulation.              PN: partial progressive weightbearing - increasing 25lb per week         PLAN  []  Upgrade activities as tolerated     [x]  Continue plan of care  []  Update interventions per flow sheet       []  Discharge due to:_  []  Other:_      Yessi Vang PTA 11/1/2021  12:53 PM    Future Appointments   Date Time Provider Sol Ulrich   11/8/2021 12:45 PM Jose Cruz Chery PT [facility]

## 2021-11-03 ENCOUNTER — APPOINTMENT (OUTPATIENT)
Dept: PHYSICAL THERAPY | Age: 46
End: 2021-11-03
Payer: MEDICAID

## 2021-11-08 ENCOUNTER — APPOINTMENT (OUTPATIENT)
Dept: PHYSICAL THERAPY | Age: 46
End: 2021-11-08
Payer: MEDICAID

## 2021-11-18 ENCOUNTER — HOSPITAL ENCOUNTER (OUTPATIENT)
Dept: PHYSICAL THERAPY | Age: 46
Discharge: HOME | End: 2021-11-18
Payer: MEDICAID

## 2021-11-18 PROCEDURE — 97110 THERAPEUTIC EXERCISES: CPT

## 2021-11-18 PROCEDURE — 97112 NEUROMUSCULAR REEDUCATION: CPT

## 2021-11-18 PROCEDURE — 97530 THERAPEUTIC ACTIVITIES: CPT

## 2021-11-18 NOTE — PROGRESS NOTES
PT DAILY TREATMENT NOTE     Patient Name: Dash Morales  Date:2021  : 1975  [x]  Patient  Verified  Payor: Priya Knight / Plan: [address] [place] [address] / Product Type: Managed Care Medicaid /    In time:10:56  Out time:11:35  Total Treatment Time (min): 39  Visit #: 2 of 8    Treatment Area: Left leg pain [M79.605]    SUBJECTIVE  Pain Level (0-10 scale): 0/10  Any medication changes, allergies to medications, adverse drug reactions, diagnosis change, or new procedure performed?: [x] No    [] Yes (see summary sheet for update)  Subjective functional status/changes:   [] No changes reported  Pt late for appointment. OBJECTIVE    15 min Therapeutic Exercise:  [x] See flow sheet :   Rationale: increase ROM and increase strength to improve the patients ability to perform ADL's.    8 min Therapeutic Activity:  [x]  See flow sheet : Step ups, hip 3-way. Rationale: increase strength, improve coordination, improve balance and increase proprioception  to improve the patients ability to perform functional activities. 8 min Neuromuscular Re-education:  [x]  See flow sheet : Mini-squats, SLS. Rationale: increase strength, improve coordination and increase proprioception  to improve the patients ability to perform functional activities. 8 min Manual Therapy:  STM/DTM Left quads, gastrocs, popliteal release. Knee flexion/ext mobs with knee in open packed position. Manual gastroc stretch. PROM Left knee end range flexion. Pt supine. The manual therapy interventions were performed at a separate and distinct time from the therapeutic activities interventions. Rationale: decrease pain, increase ROM, increase tissue extensibility and decrease trigger points to normalize gait pattern.     With   [x] TE   [] TA   [] neuro   [] other: Patient Education: [x] Review HEP    [] Progressed/Changed HEP based on:   [] positioning   [] body mechanics   [] transfers   [] heat/ice application [] other:      Other Objective/Functional Measures: Increased hip 3-way to (B) to increase Left LE strength/stability/balance. Added forward/lateral step ups leading with Left LE. Added shuttle press for Left quad strengthening and to increase Left knee flexion mobility. AROM/PROM Left knee flexion 87/90 degrees. Pain Level (0-10 scale) post treatment: 0/10    ASSESSMENT/Changes in Function: Knee flexion mobility has improved since previous progress note. Pt fully participated in treatment, pt felt challenged with hip 3-way series. Continue PT to further increase Left knee mobility and increase Left LE strength/stability to improve ease of performing daily tasks and functional activities. Patient will continue to benefit from skilled PT services to modify and progress therapeutic interventions, address functional mobility deficits, address ROM deficits, address strength deficits, analyze and address soft tissue restrictions, analyze and cue movement patterns and analyze and modify body mechanics/ergonomics to attain remaining goals. [x]  See Plan of Care  []  See progress note/recertification  []  See Discharge Summary         Progress towards goals / Updated goals:  1. The patient will improve FOTO score to 55 to improve ease of ADLs.             WF:  FOTO not assessed this session, will complete next follow up     2. The patient will improve left knee flexion to 90 degrees to improve ease of transfers.              PN:  84 degrees   Current: AROM/PROM Left knee flexion 87/90 degrees. 11/18/2021     3.  The patient demonstrate 50% weight bearing (upon clearance from MD) to improve ease of ambulation.              PN: partial progressive weightbearing - increasing 25lb per week    PLAN  []  Upgrade activities as tolerated     [x]  Continue plan of care  []  Update interventions per flow sheet       []  Discharge due to:_  []  Other:_      Franko Dean PTA 11/18/2021  11:00 AM    No future appointments.

## 2021-11-22 ENCOUNTER — HOSPITAL ENCOUNTER (OUTPATIENT)
Dept: PHYSICAL THERAPY | Age: 46
Discharge: HOME | End: 2021-11-22
Payer: MEDICAID

## 2021-11-22 PROCEDURE — 97530 THERAPEUTIC ACTIVITIES: CPT

## 2021-11-22 PROCEDURE — 97112 NEUROMUSCULAR REEDUCATION: CPT

## 2021-11-22 PROCEDURE — 97110 THERAPEUTIC EXERCISES: CPT

## 2021-11-22 NOTE — PROGRESS NOTES
PT DAILY TREATMENT NOTE     Patient Name: Melani Chapa  Date:2021  : 1975  [x]  Patient  Verified  Payor: [address] / Plan: [address] [facility] / Product Type: Managed Care Medicaid /    In time: 1  Out time:320  Total Treatment Time (min): 47  Visit #: 4 of 8    Treatment Area: Left leg pain [M79.605]    SUBJECTIVE  Pain Level (0-10 scale): 3  Any medication changes, allergies to medications, adverse drug reactions, diagnosis change, or new procedure performed?: [x] No    [] Yes (see summary sheet for update)  Subjective functional status/changes:   [] No changes reported  \"I feel like I'm swelling and it's stiff. \"     OBJECTIVE    Modality rationale: decrease inflammation and decrease pain to improve the patients ability to perform functional tasks with greater ease    Min Type Additional Details    [] Estim:  []Unatt       []IFC  []Premod                        []Other:  []w/ice   []w/heat  Position:  Location:    [] Estim: []Att    []TENS instruct  []NMES                    []Other:  []w/US   []w/ice   []w/heat  Position:  Location:    []  Traction: [] Cervical       []Lumbar                       [] Prone          []Supine                       []Intermittent   []Continuous Lbs:  [] before manual  [] after manual    []  Ultrasound: []Continuous   [] Pulsed                           []1MHz   []3MHz W/cm2:  Location:    []  Iontophoresis with dexamethasone         Location: [] Take home patch   [] In clinic   5 [x]  Ice     []  heat  []  Ice massage  []  Laser   []  Anodyne Position: Reclined   Location: left knee     []  Laser with stim  []  Other:  Position:  Location:    []  Vasopneumatic Device    []  Right     []  Left  Pre-treatment girth:  Post-treatment girth:  Measured at (location):  Pressure:       [] lo [] med [] hi   Temperature: [] lo [] med [] hi   [] Skin assessment post-treatment:  []intact []redness- no adverse reaction    []redness  adverse reaction:       16 min Therapeutic Exercise:  [x] See flow sheet :   Rationale: increase ROM, increase strength and improve coordination to improve the patients ability to perform ADLs and self care tasks with greater ease    10 min Therapeutic Activity:  [x]  See flow sheet : mini squats, step ups    Rationale: increase strength and improve coordination  to improve the patients ability to perform transfers and stair negotiation with greater ease      8 min Neuromuscular Re-education:  [x]  See flow sheet : balance   Rationale: increase strength, improve coordination, improve balance and increase proprioception  to improve the patients ability to perform functional tasks with greater ease     8 min Manual Therapy:  Supine - left patellar mobilizations, grade III tib-fem mobilizations with flexion emphasis followed by PROM    The manual therapy interventions were performed at a separate and distinct time from the therapeutic activities interventions. Rationale: decrease pain, increase ROM and increase tissue extensibility to perform functional tasks with greater ease          With   [] TE   [] TA   [] neuro   [] other: Patient Education: [x] Review HEP    [] Progressed/Changed HEP based on:   [] positioning   [] body mechanics   [] transfers   [] heat/ice application    [] other:      Other Objective/Functional Measures: FOTO score = 51   Pain Level (0-10 scale) post treatment: 0    ASSESSMENT/Changes in Function: Patient presents for progress note today. She reports she has been completing exercises at home while awaiting insurance authorization which has limited her attendance. Patient reports she sees MD tomorrow and has been progressing her weight bearing to full weight bearing. Patient continues to be limited with knee flexion ROM and presents with antalgic gait.      Patient will continue to benefit from skilled PT services to modify and progress therapeutic interventions, address functional mobility deficits, address ROM deficits, address strength deficits, analyze and address soft tissue restrictions, analyze and cue movement patterns, analyze and modify body mechanics/ergonomics, assess and modify postural abnormalities, address imbalance/dizziness and instruct in home and community integration to attain remaining goals. []  See Plan of Care  [x]  See progress note/recertification  []  See Discharge Summary         Progress towards goals / Updated goals:  1. The patient will improve FOTO score to 55 to improve ease of ADLs.             VR:  FOTO not assessed this session, will complete next follow up              Current: progressing 11/22/2021 - 51      2. The patient will improve left knee flexion to 90 degrees to improve ease of transfers.              PN:  84 degrees              Current: AROM/PROM Left knee flexion 87/90 degrees. 11/18/2021     3.  The patient demonstrate 50% weight bearing (upon clearance from MD) to improve ease of ambulation.              PN: partial progressive weightbearing - increasing 25lb per week   Current: Met 11/22/2021 - reports full WB, using 1 axillary crutch intermittently     PLAN  []  Upgrade activities as tolerated     [x]  Continue plan of care  []  Update interventions per flow sheet       []  Discharge due to:_  []  Other:_      Michi Sheikh, PT, DPT 11/22/2021  3:15 PM    Future Appointments   Date Time Provider Sol Ulrich   11/29/2021  1:45 PM Sharon Morse, PTA [facility]   12/1/2021  1:30 PM Henry Baca, PTA [facility]   12/6/2021 12:00 PM Irina Angeles, PTA [facility]   12/8/2021 11:15 AM Henry Baca, PTA [facility]   12/13/2021 12:00 PM Lorna Pascual, PT [facility]   12/15/2021 11:15 AM Finaventamara Baca, PTA [facility]

## 2021-11-22 NOTE — PROGRESS NOTES
[facility]  [address]  [place], [address]  [phone] fax    Physical Therapy Progress Note  Patient name: Lonnie Brian Start of Care: 2021   Referral source: Maria Eugenia Soliman, [address] : 1975   Medical/Treatment Diagnosis: Left leg pain [M79.605]  Payor: Taylor Colby / Plan: Fitz Cords / Product Type: Managed Care Medicaid /  Onset Date:2021     Prior Hospitalization: see medical history Provider#: [id number]   Medications: Verified on Patient Summary List    Comorbidities: HTN, asthma, left knee ORIF  Prior Level of Function: The patient was ambulatory independently. Visits from Start of Care: 25    Missed Visits: 5      Established Goals:          1. The patient will improve FOTO score to 55 to improve ease of ADLs.             TV:  FOTO not assessed this session, will complete next follow up              Current: progressing - 51      2. The patient will improve left knee flexion to 90 degrees to improve ease of transfers.              PN:  84 degrees              Current: AROM/PROM Left knee flexion 87/90 degrees     3. The patient demonstrate 50% weight bearing (upon clearance from MD) to improve ease of ambulation.              PN: partial progressive weightbearing - increasing 25lb per week              Current: Met - reports full WB, using 1 axillary crutch intermittently     Key Functional Changes: improved weight bearing tolerance, improved functional mobility as measured by FOTO     Updated Goals: to be achieved in 3 weeks:  1. The patient will improve FOTO score to 55 to improve ease of ADLs.             Re-cert: 72   2. The patient will improve left knee flexion to 90 degrees to improve ease of transfers.              Re-cert: Left knee [id number] 20 degrees  3. Patient will ambulate without AD and minimal compensations to promote return to PLOF.     Re-cert: ambulating intermittently with 1 axillary crutch with antalgic gait, decreased knee flexion on swing phase     ASSESSMENT/RECOMMENDATIONS: Patient presents for progress note today. She reports she has been completing exercises at home while awaiting insurance authorization which has limited her attendance. Patient reports she sees MD tomorrow and has been progressing her weight bearing to full weight bearing. Patient continues to be limited with knee flexion ROM and presents with antalgic gait. [x]Continue therapy per initial plan/protocol at a frequency of  2 x per week for 3 weeks  []Continue therapy with the following recommended changes:_____________________      _____________________________________________________________________  []Discontinue therapy progressing towards or have reached established goals  []Discontinue therapy due to lack of appreciable progress towards goals  []Discontinue therapy due to lack of attendance or compliance  []Await Physician's recommendations/decisions regarding therapy  []Other:________________________________________________________________    Thank you for this referral.    Nita Iglesias, PT, DPT 11/22/2021 3:28 PM  NOTE TO PHYSICIAN:  PLEASE COMPLETE THE ORDERS BELOW AND   FAX TO [facility]: [phone]  If you are unable to process this request in 24 hours please contact our office: [phone]    ? I have read the above report and request that my patient continue as recommended. ? I have read the above report and request that my patient continue therapy with the following changes/special instructions:__________________________________________________________  ? I have read the above report and request that my patient be discharged from therapy.     Physicians signature: ______________________________Date: ______Time:______     GENET Dougherty

## 2021-11-29 ENCOUNTER — APPOINTMENT (OUTPATIENT)
Dept: PHYSICAL THERAPY | Age: 46
End: 2021-11-29
Payer: MEDICAID

## 2021-12-01 ENCOUNTER — HOSPITAL ENCOUNTER (OUTPATIENT)
Dept: PHYSICAL THERAPY | Age: 46
Discharge: HOME | End: 2021-12-01
Payer: MEDICAID

## 2021-12-01 PROCEDURE — 97110 THERAPEUTIC EXERCISES: CPT

## 2021-12-01 PROCEDURE — 97530 THERAPEUTIC ACTIVITIES: CPT

## 2021-12-01 PROCEDURE — 97112 NEUROMUSCULAR REEDUCATION: CPT

## 2021-12-01 NOTE — PROGRESS NOTES
PT DAILY TREATMENT NOTE     Patient Name: Kina Arciniega  Date:2021  : 1975  [x]  Patient  Verified  Payor: [address] / Plan: [address] [facility] / Product Type: Managed Care Medicaid /    In time:1:32  Out time:2:18  Total Treatment Time (min): 46  Visit #: 1 of 6    Treatment Area: Left leg pain [M79.605]    SUBJECTIVE  Pain Level (0-10 scale): 3/10  Any medication changes, allergies to medications, adverse drug reactions, diagnosis change, or new procedure performed?: [x] No    [] Yes (see summary sheet for update)  Subjective functional status/changes:   [x] No changes reported    OBJECTIVE    Modality rationale: decrease inflammation and decrease pain to improve the patients ability to perform ADL's.    Min Type Additional Details    [] Estim:  []Unatt       []IFC  []Premod                        []Other:  []w/ice   []w/heat  Position:  Location:    [] Estim: []Att    []TENS instruct  []NMES                    []Other:  []w/US   []w/ice   []w/heat  Position:  Location:    []  Traction: [] Cervical       []Lumbar                       [] Prone          []Supine                       []Intermittent   []Continuous Lbs:  [] before manual  [] after manual    []  Ultrasound: []Continuous   [] Pulsed                           []1MHz   []3MHz W/cm2:  Location:    []  Iontophoresis with dexamethasone         Location: [] Take home patch   [] In clinic   5 [x]  Ice     []  heat  []  Ice massage  []  Laser   []  Anodyne Position: Reclined  Location: Left knee    []  Laser with stim  []  Other:  Position:  Location:    []  Vasopneumatic Device    []  Right     []  Left  Pre-treatment girth:  Post-treatment girth:  Measured at (location):  Pressure:       [] lo [] med [] hi   Temperature: [] lo [] med [] hi   [] Skin assessment post-treatment:  []intact []redness- no adverse reaction    []redness  adverse reaction:     17 min Therapeutic Exercise:  [x] See flow sheet :   Rationale: increase ROM and increase strength to improve the patients ability to perform ADL's.    8 min Therapeutic Activity:  [x]  See flow sheet : mini-squats, step ups   Rationale: increase strength, improve coordination and increase proprioception  to improve the patients ability to perform functional activities. 8 min Neuromuscular Re-education:  [x]  See flow sheet : Left SLS, SLS with hip 3-way. Rationale: increase strength, improve balance and increase proprioception  to improve the patients ability to perform functional activities. 5 min Manual Therapy: DTM Left quads. Knee flexion/ext mobs with knee in open packed position. PROM Left knee end range flexion. Pt seated and supine. The manual therapy interventions were performed at a separate and distinct time from the therapeutic activities interventions. Rationale: decrease pain, increase ROM, increase tissue extensibility and decrease trigger points to improve ease of ambulation and negotiating up/down stairs. With   [x] TE   [] TA   [] neuro   [] other: Patient Education: [x] Review HEP    [] Progressed/Changed HEP based on:   [] positioning   [] body mechanics   [] transfers   [] heat/ice application    [] other:      Other Objective/Functional Measures: Left SLS 15\" before requiring handrail support/assist. Added CoreAlign lunge to improve Left knee flexion ROM and Left LE strength. Continues to have a mild Left extension lag with SLR's. Pain Level (0-10 scale) post treatment: 0/10    ASSESSMENT/Changes in Function: Demonstrates improved squat mechanics, mild Left SI hike secondary to Knee ROM limitations. Pain/soreness gradually increases with standing/weight bearing exercises. Continue PT to further increase left knee mobility and Left LE to normalize gait pattern and improve ease of performing functional activities.     Patient will continue to benefit from skilled PT services to modify and progress therapeutic interventions, address functional mobility deficits, address ROM deficits, address strength deficits, analyze and address soft tissue restrictions, analyze and cue movement patterns and analyze and modify body mechanics/ergonomics to attain remaining goals. [x]  See Plan of Care  []  See progress note/recertification  []  See Discharge Summary         Progress towards goals / Updated goals:  Updated Goals: to be achieved in 3 weeks:  1. The patient will improve FOTO score to 55 to improve ease of ADLs.             Re-cert: 79   2. The patient will improve left knee flexion to 90 degrees to improve ease of transfers.              Re-cert: Left knee EMPCIJC 89 degrees  3. Patient will ambulate without AD and minimal compensations to promote return to PLOF.                Re-cert: ambulating intermittently with 1 axillary crutch with antalgic gait, decreased knee flexion on swing phase     PLAN  []  Upgrade activities as tolerated     [x]  Continue plan of care  []  Update interventions per flow sheet       []  Discharge due to:_  []  Other:_      Presley Syed, PTA 12/1/2021  1:39 PM    Future Appointments   Date Time Provider Sol Ulrich   12/2/2021  7:00 AM [facility]   12/6/2021 12:00 PM Paul Bueno, PTA [facility]   12/8/2021 11:15 AM [facility]on[facility]   12/13/2021 12:00 PM Verenice De, [facility]   12/15/2021 11:15 AM Tete Limon, [facility]

## 2021-12-06 ENCOUNTER — HOSPITAL ENCOUNTER (OUTPATIENT)
Dept: PHYSICAL THERAPY | Age: 46
Discharge: HOME | End: 2021-12-06
Payer: MEDICAID

## 2021-12-06 PROCEDURE — 97112 NEUROMUSCULAR REEDUCATION: CPT

## 2021-12-06 PROCEDURE — 97110 THERAPEUTIC EXERCISES: CPT

## 2021-12-06 PROCEDURE — 97530 THERAPEUTIC ACTIVITIES: CPT

## 2021-12-06 NOTE — PROGRESS NOTES
PT DAILY TREATMENT NOTE     Patient Name: Yaquelin Nolan  Date:2021  : 1975  [x]  Patient  Verified  Payor: [address] / Plan: [address] [facility] / Product Type: Managed Care Medicaid /    In time:12:03  Out time: 12:50  Total Treatment Time (min): 47  Visit #: 2 of 6      Treatment Area: Left leg pain [M79.605]    SUBJECTIVE  Pain Level (0-10 scale): 0  Any medication changes, allergies to medications, adverse drug reactions, diagnosis change, or new procedure performed?: [x] No    [] Yes (see summary sheet for update)  Subjective functional status/changes:   [] No changes reported  Pt reports she has been having some calf pain over the weekend that limited her mobility and caused a lot of pain. OBJECTIVE  []redness  adverse reaction:       25 min Therapeutic Exercise:  [x] See flow sheet :   Rationale: increase ROM and increase strength to improve the patients ability to perform functional task with ease. 8 min Therapeutic Activity:  [x]  See flow sheet :   Rationale: increase strength and improve coordination  to improve the patients ability to perform daily task with ease. 8 min Neuromuscular Re-education:  [x]  See flow sheet :   Rationale: increase strength, improve coordination, improve balance and increase proprioception  to improve the patients ability to perform ADL's with ease. 6 min Manual Therapy:  DTM Left quads. Knee flexion/ext mobs with knee in open packed position. PROM Left knee end range flexion. Pt seated and supine   The manual therapy interventions were performed at a separate and distinct time from the therapeutic activities interventions. Rationale: decrease pain, increase ROM and increase tissue extensibility to improve functional mobility with ambulation ADL's.           With   [] TE   [] TA   [] neuro   [] other: Patient Education: [x] Review HEP    [] Progressed/Changed HEP based on:   [] positioning   [] body mechanics   [] transfers   [] heat/ice application    [] other:      Other Objective/Functional Measures:   Left knee flexion ROM 90 deg, left knee PROM 97 deg     Pain Level (0-10 scale) post treatment: 0    ASSESSMENT/Changes in Function:   Pt continues to lack left knee flexion noting increased tightness at the distal quad and in the left gastroc. Pt notes sporadic instances of her left knee locking with gastroc discomfort over the weekend that prevented mobility. Although of slow nature pt does display improved left knee AROM this visit reaching 90 deg with mild end range pain and 97 deg with PROM. Pt reports no pain post treatment but some continued tightness. Patient will continue to benefit from skilled PT services to modify and progress therapeutic interventions, address functional mobility deficits, address ROM deficits, address strength deficits, analyze and address soft tissue restrictions, analyze and cue movement patterns, analyze and modify body mechanics/ergonomics and assess and modify postural abnormalities to attain remaining goals. [x]  See Plan of Care  []  See progress note/recertification  []  See Discharge Summary         Progress towards goals / Updated goals:  Updated Goals: to be achieved in 3 weeks:  1. The patient will improve FOTO score to 55 to improve ease of ADLs.             Re-cert: 51   2. The patient will improve left knee flexion to 90 degrees to improve ease of transfers.              Re-cert: Left knee [id number] 56 degrees   Current: Met 12/6/21 left knee flexion AROM 90 deg  3. Patient will ambulate without AD and minimal compensations to promote return to PLOF.                Re-cert: ambulating intermittently with 1 axillary crutch with antalgic gait, decreased knee flexion on swing phase        PLAN  []  Upgrade activities as tolerated     [x]  Continue plan of care  []  Update interventions per flow sheet       []  Discharge due to:_  []  Other:_      Danilo Pak PTA 12/6/2021  12:05 PM    Future Appointments   Date Time Provider Sol Ulrich   12/8/2021 11:15 AM Adis Xavier, PTA [facility]   12/13/2021 12:00 PM Dot Grossman, PT [facility]   12/15/2021 11:15 AM Maxi Zaldivar, PTA [facility]

## 2021-12-08 ENCOUNTER — HOSPITAL ENCOUNTER (OUTPATIENT)
Dept: PHYSICAL THERAPY | Age: 46
Discharge: HOME | End: 2021-12-08
Payer: MEDICAID

## 2021-12-08 PROCEDURE — 97110 THERAPEUTIC EXERCISES: CPT

## 2021-12-08 PROCEDURE — 97112 NEUROMUSCULAR REEDUCATION: CPT

## 2021-12-08 PROCEDURE — 97530 THERAPEUTIC ACTIVITIES: CPT

## 2021-12-08 NOTE — PROGRESS NOTES
PT DAILY TREATMENT NOTE     Patient Name: Bindu Best  Date:2021  : 1975  [x]  Patient  Verified  Payor: [address] / Plan: [address] [facility] / Product Type: Managed Care Medicaid /    In time:11:16  Out time:12:03  Total Treatment Time (min): 52  Visit #: 3 of 6    Treatment Area: Left leg pain [M79.605]    SUBJECTIVE  Pain Level (0-10 scale): 0/10  Any medication changes, allergies to medications, adverse drug reactions, diagnosis change, or new procedure performed?: [x] No    [] Yes (see summary sheet for update)  Subjective functional status/changes:   [] No changes reported  \"No pain, just stiff. \"    OBJECTIVE    Modality rationale: decrease inflammation and decrease pain to improve the patients ability to perform ADL's.    Min Type Additional Details    [] Estim:  []Unatt       []IFC  []Premod                        []Other:  []w/ice   []w/heat  Position:  Location:    [] Estim: []Att    []TENS instruct  []NMES                    []Other:  []w/US   []w/ice   []w/heat  Position:  Location:    []  Traction: [] Cervical       []Lumbar                       [] Prone          []Supine                       []Intermittent   []Continuous Lbs:  [] before manual  [] after manual    []  Ultrasound: []Continuous   [] Pulsed                           []1MHz   []3MHz W/cm2:  Location:    []  Iontophoresis with dexamethasone         Location: [] Take home patch   [] In clinic   5 [x]  Ice     []  heat  []  Ice massage  []  Laser   []  Anodyne Position: Supine  Location: Left knee    []  Laser with stim  []  Other:  Position:  Location:    []  Vasopneumatic Device    []  Right     []  Left  Pre-treatment girth:  Post-treatment girth:  Measured at (location):  Pressure:       [] lo [] med [] hi   Temperature: [] lo [] med [] hi   [] Skin assessment post-treatment:  []intact []redness- no adverse reaction    []redness  adverse reaction:      20 min Therapeutic Exercise:  [x] See flow sheet :   Rationale: increase ROM and increase strength to improve the patients ability to perform ADL's.    8 min Therapeutic Activity:  -  See flow sheet : Dynamic step ups, Squats   Rationale: increase strength, improve coordination and increase proprioception  to improve the patients ability to perform work duties. 8 min Neuromuscular Re-education:  [x]  See flow sheet : CoreAlign lunges, SLS on floor and airex for 30\" each. Rationale: increase strength, improve coordination, improve balance and increase proprioception  to improve the patients ability to perform functional activities. 6 min Manual Therapy:  Left patellar mobs, STM/DTM distal quads, lateral gastroc head. Knee flex/ext mobs in open packed position, PROM knee flex to end range as tolerable. Pt supine. The manual therapy interventions were performed at a separate and distinct time from the therapeutic activities interventions. Rationale: decrease pain, increase ROM, increase tissue extensibility and decrease trigger points to improve ease of performing functional activities. With   [x] TE   [] TA   [] neuro   [] other: Patient Education: [x] Review HEP    [] Progressed/Changed HEP based on:   [] positioning   [] body mechanics   [] transfers   [] heat/ice application    [] other:      Other Objective/Functional Measures: Changed step ups to dynamic to increase hip ext strength and stability. Pain Level (0-10 scale) post treatment: 0/10    ASSESSMENT/Changes in Function: Gradual progress, improved lunge mechanics on CoreAlign. Able to maintain Left SLS on floor for 30\" without LOB. Continues to ambulate with decreased flexion during Left swing phase of gait, mild antalgic gait without AD. Pt has 2 more F/U visits scheduled, possible continuation of PT.     Patient will continue to benefit from skilled PT services to modify and progress therapeutic interventions, address functional mobility deficits, address ROM deficits, address strength deficits, analyze and address soft tissue restrictions, analyze and cue movement patterns and analyze and modify body mechanics/ergonomics to attain remaining goals. [x]  See Plan of Care  []  See progress note/recertification  []  See Discharge Summary         Progress towards goals / Updated goals:  Updated Goals: to be achieved in 3 weeks:  1. The patient will improve FOTO score to 55 to improve ease of ADLs.             Re-cert: 51   2. The patient will improve left knee flexion to 90 degrees to improve ease of transfers.              Re-cert: Left knee [id number] 42 degrees              Current: Met 12/6/21 left knee flexion AROM 90 deg  3. Patient will ambulate without AD and minimal compensations to promote return to PLOF.                Re-cert: ambulating intermittently with 1 axillary crutch with antalgic gait, decreased knee flexion on swing phase   Current: Continues to ambulate with decreased flexion during Left swing phase of gait, mild antalgic gait without AD. 12/8/2021     PLAN  []  Upgrade activities as tolerated     [x]  Continue plan of care  []  Update interventions per flow sheet       []  Discharge due to:_  []  Other:_      Jayden Kerr PTA 12/8/2021  11:18 AM    Future Appointments   Date Time Provider Sol Ulrich   12/13/2021 12:00 PM [facility]uca[facility]   12/15/2021 11:15 AM Daysi Chu, PTA [facility]

## 2021-12-13 ENCOUNTER — APPOINTMENT (OUTPATIENT)
Dept: PHYSICAL THERAPY | Age: 46
End: 2021-12-13
Payer: MEDICAID

## 2022-01-10 ENCOUNTER — APPOINTMENT (OUTPATIENT)
Dept: PHYSICAL THERAPY | Age: 47
End: 2022-01-10
Payer: MEDICAID

## 2022-01-11 NOTE — PROGRESS NOTES
[facility]  [address]  [place], [address]  [phone] fax    Physical Therapy Discharge Summary  Patient name: Zenon Izaguirre Start of Care: 2021   Referral source: Jomar Dougherty : 1975   Medical/Treatment Diagnosis: Left leg pain [M79.605]  Payor: [facility] / Plan: [address] [employer] / Product Type: Managed Care Medicaid /  Onset Date:2021     Prior Hospitalization: see medical history Provider#: [id number]   Medications: Verified on Patient Summary List    Comorbidities: HTN, asthma, left knee ORIF  Prior Level of Function:pt ambulatory and independent with ADLs  Visits from Start of Care: 28    Missed Visits: 7  Reporting Period : 2021 to 2021      Summary of Care:  1. The patient will improve FOTO score to 55 to improve ease of ADLs.             Re-cert: 51   2. The patient will improve left knee flexion to 90 degrees to improve ease of transfers.              Re-cert: Left knee [id number] 81 degrees              Current: Met 21 left knee flexion AROM 90 deg  3. Patient will ambulate without AD and minimal compensations to promote return to PLOF.             Re-cert: ambulating intermittently with 1 axillary crutch with antalgic gait, decreased knee flexion on swing phase              Current: Continues to ambulate with decreased flexion during Left swing phase of gait, mild antalgic gait without AD. 2021       ASSESSMENT/RECOMMENDATIONS:  Pt has not returned to therapy since 2021 and is being discharged at this time. Unable to reassess goals at this time. D/C without further pt instructions.  Thank you for this referral    [x]Discontinue therapy: []Patient has reached or is progressing toward set goals      [x]Patient is non-compliant or has abdicated      []Due to lack of appreciable progress towards set goals    Sherry Ponce, PT 2022 11:43 AM    NOTE TO PHYSICIAN: Please complete the following and fax to: [facility] at [facility] at [phone]  . Retain this original for your records. If you are unable to process this request in   24 hours, please contact our office.      [] I have read the above report and request that my patient continue therapy with the following changes/special instructions:  [] I have read the above report and request that my patient be discharged from therapy    Physician's Signature:____________Date:_________TIME:________     GENET Villanueva  ** Signature, Date and Time must be completed for valid certification **

## 2022-01-12 ENCOUNTER — HOSPITAL ENCOUNTER (OUTPATIENT)
Dept: PHYSICAL THERAPY | Age: 47
Discharge: HOME | End: 2022-01-12
Payer: MEDICAID

## 2022-01-12 PROCEDURE — 97162 PT EVAL MOD COMPLEX 30 MIN: CPT

## 2022-01-12 NOTE — PROGRESS NOTES
[facility]  [address]  [place], [address]  [phone] fax    Plan of Care/ Statement of Necessity for Physical Therapy Services    Patient name: Yasir Baca Start of Care: 2022   Referral source: Keila Núñez, [address] : 1975    Medical Diagnosis: Left knee pain [M25.562]  Payor: [facility] / Plan: [address] [employer] / Product Type: Managed Care Medicaid /  Onset Date:21    Treatment Diagnosis: Left knee pain   Prior Hospitalization: see medical history Provider#: [id number]   Medications: Verified on Patient summary List    Comorbidities: Asthma, HTN, left tibial plateau fx   Prior Level of Function: The patient reports that prior to the injury she was independent ambulator and able to run and jump. The Plan of Care and following information is based on the information from the initial evaluation. Assessment/ key information: The patient is a 48year old female with a chief complaint of left knee pain that suffered a tibial plateau fx and had surgery 21, did have an external fixator as well. She was [facility] at [facility] but was discharged in December due to non-compliance. She has returned for treatment, does have improved range of motion of her left knee and has returned to work but has had more pain since standing more. The patient has impairments related to the diagnosis consisting of pain, decreased ROM, decreased flexibility, decreased strength, limited ADL ease, and decreased quality of life. The patient will benefit from skilled PT in order to address the aforementioned impairments.      Evaluation Complexity History MEDIUM  Complexity : 1-2 comorbidities / personal factors will impact the outcome/ POC ; Examination MEDIUM Complexity : 3 Standardized tests and measures addressing body structure, function, activity limitation and / or participation in recreation  ;Presentation MEDIUM Complexity : Evolving with changing characteristics  ; Clinical Decision Making MEDIUM Complexity : FOTO score of 26-74  Overall Complexity Rating: MEDIUM  Problem List: pain affecting function, decrease ROM, decrease strength, edema affecting function, impaired gait/ balance, decrease ADL/ functional abilitiies, decrease activity tolerance, decrease flexibility/ joint mobility and decrease transfer abilities   Treatment Plan may include any combination of the following: Therapeutic exercise, Therapeutic activities, Neuromuscular re-education, Physical agent/modality, Gait/balance training, Manual therapy, Patient education, Self Care training, Functional mobility training, Home safety training and Stair training  Patient / Family readiness to learn indicated by: asking questions, trying to perform skills and interest  Persons(s) to be included in education: patient (P)  Barriers to Learning/Limitations: None  Patient Goal (s): to be able to run, jump, bend.   Patient Self Reported Health Status: good  Rehabilitation Potential: good    Short Term Goals: To be accomplished in 2 weeks:   1. The patient will demonstrate independence and compliance of HEP to maximize therapeutic benefit. 2. The patient will attain left knee flexion of 110 degrees to improve ease of ambulation. Long Term Goals: To be accomplished in 4 weeks:   1. The patient will improve FOTO score to 66 to improve quality of life. 2. The patient will improve left knee flexion to 120 degrees to improve ease of stair negotiation. 3. The patient will demonstrate single leg balance on airex to 30\" without LOB to improve ease of ambulation efficiency. 4. The patient will demonstrate step down control with 4\" step with good quad eccentric control in order to improve ease of stair negotiation. Frequency / Duration: Patient to be seen 2 times per week for 4 weeks.     Patient/ Caregiver education and instruction: Diagnosis, prognosis, self care, activity modification and exercises   [x]  Plan of care has been reviewed with NACHO Leon, PT 1/12/2022 1:30 PM    ________________________________________________________________________    I certify that the above Therapy Services are being furnished while the patient is under my care. I agree with the treatment plan and certify that this therapy is necessary.     [de-identified] Signature:____________Date:_________TIME:________     Nicolette Michael PA  ** Signature, Date and Time must be completed for valid certification **    Please sign and return to [facility] [address] [place] [address], [facility] [address]  [phone] fax

## 2022-01-12 NOTE — PROGRESS NOTES
PT DAILY TREATMENT NOTE     Patient Name: Dash Salgado  Date:2022  : 1975  [x]  Patient  Verified  Payor: [address] / Plan: [facility] / Product Type: Managed Care Medicaid /    In time:12:52  Out time:1:30  Total Treatment Time (min): 38  Visit #: 1 of 8    Treatment Area: Left knee pain [M25.562]    SUBJECTIVE  Pain Level (0-10 scale): 3/10  Any medication changes, allergies to medications, adverse drug reactions, diagnosis change, or new procedure performed?: [x] No    [] Yes (see summary sheet for update)  Subjective functional status/changes:   [] No changes reported  The patient has a chief complaint of left knee pain limiting her ease of standing and bending. OBJECTIVE  12 min Therapeutic Exercise:  [x] See flow sheet :   Rationale: increase ROM and increase strength to improve the patients ability to improve ADL ease. With   [] TE   [] TA   [] neuro   [] other: Patient Education: [x] Review HEP    [] Progressed/Changed HEP based on:   [] positioning   [] body mechanics   [] transfers   [] heat/ice application    [] other:      Other Objective/Functional Measures: See IE     Pain Level (0-10 scale) post treatment: 2-3/10    ASSESSMENT/Changes in Function: See POC    Patient will continue to benefit from skilled PT services to modify and progress therapeutic interventions, address ROM deficits, address strength deficits, analyze and address soft tissue restrictions, analyze and cue movement patterns, analyze and modify body mechanics/ergonomics, assess and modify postural abnormalities and instruct in home and community integration to attain remaining goals. [x]  See Plan of Care  []  See progress note/recertification  []  See Discharge Summary         Progress towards goals / Updated goals:  Short Term Goals:  To be accomplished in 2 weeks:               1. The patient will demonstrate independence and compliance of HEP to maximize therapeutic benefit. IE: issued HEP               2. The patient will attain left knee flexion of 110 degrees to improve ease of ambulation. IE: 100 degrees PROM  Long Term Goals: To be accomplished in 4 weeks:               1. The patient will improve FOTO score to 66 to improve quality of life. IE: 62               2. The patient will improve left knee flexion to 120 degrees to improve ease of stair negotiation. IE: 100 degrees               3. The patient will demonstrate single leg balance on airex to 30\" without LOB to improve ease of ambulation efficiency. IE: 12\"                4. The patient will demonstrate step down control with 4\" step with good quad eccentric control in order to improve ease of stair negotiation. IE: 2\" with fair control    PLAN  [x]  Upgrade activities as tolerated     []  Continue plan of care  []  Update interventions per flow sheet       []  Discharge due to:_  []  Other:_      Talisha Santos, PT 1/12/2022  1:39 PM    No future appointments.

## 2022-01-20 ENCOUNTER — HOSPITAL ENCOUNTER (OUTPATIENT)
Dept: PHYSICAL THERAPY | Age: 47
Discharge: HOME | End: 2022-01-20
Payer: MEDICAID

## 2022-01-20 PROCEDURE — 97112 NEUROMUSCULAR REEDUCATION: CPT

## 2022-01-20 PROCEDURE — 97530 THERAPEUTIC ACTIVITIES: CPT

## 2022-01-20 PROCEDURE — 97110 THERAPEUTIC EXERCISES: CPT

## 2022-01-20 NOTE — PROGRESS NOTES
PT DAILY TREATMENT NOTE     Patient Name: Yordan Feliz  Date:2022  : 1975  [x]  Patient  Verified  Payor: Lisandra Alexandre / Plan: [address] [place] [address] / Product Type: Managed Care Medicaid /    In time:10:45  Out time:11:16  Total Treatment Time (min): 31  Visit #: 2 of 8    Treatment Area: Left knee pain [M25.562]    SUBJECTIVE  Pain Level (0-10 scale): 2  Any medication changes, allergies to medications, adverse drug reactions, diagnosis change, or new procedure performed?: [x] No    [] Yes (see summary sheet for update)  Subjective functional status/changes:   [] No changes reported  Pt reports doing her home exercises     OBJECTIVE    10 min Therapeutic Exercise:  [x] See flow sheet :   Rationale: increase ROM and increase strength to improve the patients ability to perform ADLs    8 min Therapeutic Activity:  [x]  See flow sheet :   Rationale: increase strength, improve coordination, improve balance and increase proprioception  to improve the patients ability to increase ease with daily tasks     8 min Neuromuscular Re-education:  [x]  See flow sheet :   Rationale: increase strength, improve coordination, improve balance and increase proprioception  to improve the patients ability to perform functional tasks    5 min Manual Therapy:  Patella mobs, tib fem jt mobs for knee flex, manual knee flex str   The manual therapy interventions were performed at a separate and distinct time from the therapeutic activities interventions.   Rationale: decrease pain, increase ROM and increase tissue extensibility to improve functional mobility             With   [] TE   [] TA   [] neuro   [] other: Patient Education: [x] Review HEP    [] Progressed/Changed HEP based on:   [] positioning   [] body mechanics   [] transfers   [] heat/ice application    [] other:      Other Objective/Functional Measures:    First f/u after Eval     Pain Level (0-10 scale) post treatment: 3-4    ASSESSMENT/Changes in Function: Initiated treatment program per flow sheet. Reviewed HEP for understanding and compliance. Pt reports difficulty descending stairs at work and is greatly challenged with 2 inch ecc step down today. Good tolerance to manual therapy. Following treatment, pt reports decr'd tension and improved ease with standing up from chair. PD modality and plans to ice at home. Patient will continue to benefit from skilled PT services to modify and progress therapeutic interventions, address functional mobility deficits, address ROM deficits, address strength deficits, analyze and address soft tissue restrictions, analyze and cue movement patterns, analyze and modify body mechanics/ergonomics and assess and modify postural abnormalities to attain remaining goals. []  See Plan of Care  []  See progress note/recertification  []  See Discharge Summary         Progress towards goals / Updated goals:  Short Term Goals: To be accomplished in 2 weeks:               1. The patient will demonstrate independence and compliance of HEP to maximize therapeutic benefit. IE: issued HEP   Current: Pt reports compliance 1/20/2022               2. The patient will attain left knee flexion of 110 degrees to improve ease of ambulation. IE: 100 degrees PROM  [facility] be accomplished in 4 weeks:               1. The patient will improve FOTO score to 66 to improve quality of life. IE: 62               2. The patient will improve left knee flexion to 120 degrees to improve ease of stair negotiation. IE: 100 degrees               3. The patient will demonstrate single leg balance on airex to 30\" without LOB to improve ease of ambulation efficiency. IE: 12\"                4. The patient will demonstrate step down control with 4\" step with good quad eccentric control in order to improve ease of stair negotiation.                IE: 2\" with fair control       PLAN  []  Upgrade activities as tolerated     []  Continue plan of care  []  Update interventions per flow sheet       []  Discharge due to:_  []  Other:_      Maye Kilgore PTA 1/20/2022  10:42 AM    Future Appointments   Date Time Provider Sol Ulrich   1/20/2022 10:45 AM Tash Amin PTA [facility]

## 2022-02-01 ENCOUNTER — HOSPITAL ENCOUNTER (OUTPATIENT)
Dept: PHYSICAL THERAPY | Age: 47
Discharge: HOME | End: 2022-02-01
Payer: MEDICAID

## 2022-02-01 PROCEDURE — 97110 THERAPEUTIC EXERCISES: CPT

## 2022-02-01 PROCEDURE — 97530 THERAPEUTIC ACTIVITIES: CPT

## 2022-02-01 NOTE — PROGRESS NOTES
PT DAILY TREATMENT NOTE     Patient Name: Tonya Richey  Date:2022  : 1975  [x]  Patient  Verified  Payor: Perlita Nation / Plan: [address] [place] [address] / Product Type: Managed Care Medicaid /    In time: 11:15 AM  Out time:11:51  Total Treatment Time (min): 41   Visit #: 3 of 8    Treatment Area: Left knee pain [M25.562]    SUBJECTIVE  Pain Level (0-10 scale): 1  Any medication changes, allergies to medications, adverse drug reactions, diagnosis change, or new procedure performed?: [x] No    [] Yes (see summary sheet for update)  Subjective functional status/changes:   [] No changes reported  Says she is \"feeling pretty good today\" but has been feeling sore since getting back to work    OBJECTIVE    24 min Therapeutic Exercise:  [x] See flow sheet :   Rationale: increase ROM and increase strength to improve the patients ability to perform household chores. 12 min Therapeutic Activity:  [x]  See flow sheet : squats, step ups   Rationale: increase ROM, increase strength and improve coordination  to improve the patients ability to navigate stairs and perform functional transfers with improved mechanics and efficiency. 5 min Manual Therapy:    Supine - Grade III-IV patellar mobilizations and tibiofemoral mobilizations, passive knee flexion with over pressure   The manual therapy interventions were performed at a separate and distinct time from the therapeutic activities interventions. Rationale: increase ROM and increase tissue extensibility to normalize gait mechanics      With   [x] TE   [] TA   [] neuro   [] other: Patient Education: [] Review HEP    [] Progressed/Changed HEP based on:   [x] positioning   [x] body mechanics   [] transfers   [] heat/ice application    [] other:      Other Objective/Functional Measures:   Left knee flexion AROM 100 deg     Pain Level (0-10 scale) post treatment: 2    ASSESSMENT/Changes in Function: Patient puts forth good effort.  Responds well to cues for mechanics in step ups, squats, and leg press. Demo good quadriceps control with 2\" step down, balance fair. Patient will continue to benefit from skilled PT services to modify and progress therapeutic interventions, address functional mobility deficits, address ROM deficits, address strength deficits, analyze and address soft tissue restrictions, analyze and cue movement patterns and analyze and modify body mechanics/ergonomics to attain remaining goals. Progress towards goals / Updated goals:  Short Term Goals: To be accomplished in 2 weeks:               1. The patient will demonstrate independence and compliance of HEP to maximize therapeutic benefit.              IE: issued HEP              Current: Pt reports compliance 1/20/2022               2. The patient will attain left knee flexion of 110 degrees to improve ease of ambulation.              IE: 100 degrees PROM   Current: 2/1/2022 - Unchanged - 100 deg     Long Term Goals: To be accomplished in 4 weeks:               1. The patient will improve FOTO score to 66 to improve quality of life.              IE: 57               2. The patient will improve left knee flexion to 120 degrees to improve ease of stair negotiation.              IE: 100 degrees               3. The patient will demonstrate single leg balance on airex to 30\" without LOB to improve ease of ambulation efficiency.             RC: 12\"                4.  The patient will demonstrate step down control with 4\" step with good quad eccentric control in order to improve ease of stair negotiation.               IE: 2\" with fair control    PLAN  [x]  Upgrade activities as tolerated     []  Continue plan of care  []  Update interventions per flow sheet       []  Discharge due to:_  []  Other:_      Alexandrea Mckenzie PT 2/1/2022  11:17 AM    Future Appointments   Date Time Provider Sol Ulrich   2/3/2022 10:45 AM Judah Stevens PTA [facility]   2/8/2022 10:45 AM Magui Kacie Leary, PTA [facility]

## 2022-02-03 ENCOUNTER — APPOINTMENT (OUTPATIENT)
Dept: PHYSICAL THERAPY | Age: 47
End: 2022-02-03
Payer: MEDICAID

## 2022-02-04 ENCOUNTER — HOSPITAL ENCOUNTER (OUTPATIENT)
Dept: PHYSICAL THERAPY | Age: 47
Discharge: HOME | End: 2022-02-04
Payer: MEDICAID

## 2022-02-04 PROCEDURE — 97530 THERAPEUTIC ACTIVITIES: CPT

## 2022-02-04 PROCEDURE — 97110 THERAPEUTIC EXERCISES: CPT

## 2022-02-04 PROCEDURE — 97112 NEUROMUSCULAR REEDUCATION: CPT

## 2022-02-04 NOTE — PROGRESS NOTES
PT DAILY TREATMENT NOTE     Patient Name: Shilpi Luis  Date:2022  : 1975  [x]  Patient  Verified  Payor: Jolynn [address] [employer] Luisa / Plan: Perla Bennett / Product Type: Managed Care Medicaid /    In time:10:10  Out time:10:45  Total Treatment Time (min): 35  Visit #: 4 of 8    Treatment Area: Left knee pain [M25.562]    SUBJECTIVE  Pain Level (0-10 scale): 4  Any medication changes, allergies to medications, adverse drug reactions, diagnosis change, or new procedure performed?: [x] No    [] Yes (see summary sheet for update)  Subjective functional status/changes:   [] No changes reported  Pt reports having more pain today, maybe because of the weather    OBJECTIVE  14 min Therapeutic Exercise:  [x] See flow sheet :   Rationale: increase ROM and increase strength to improve the patients ability to perform ADLs    8 min Therapeutic Activity:  [x]  See flow sheet :   Rationale: increase strength, improve coordination and improve balance  to improve the patients ability to increase ease with daily tasks     8 min Neuromuscular Re-education:  [x]  See flow sheet :   Rationale: increase strength, improve coordination, improve balance and increase proprioception  to improve the patients ability to perform functional tasks    5 min Manual Therapy:  Patella mobs, tib fem jt mobs for knee flex, manual knee flex str   The manual therapy interventions were performed at a separate and distinct time from the therapeutic activities interventions.   Rationale: decrease pain, increase ROM and increase tissue extensibility to improve functional mobility             With   [] TE   [] TA   [] neuro   [] other: Patient Education: [x] Review HEP    [] Progressed/Changed HEP based on:   [] positioning   [] body mechanics   [] transfers   [] heat/ice application    [] other:      Other Objective/Functional Measures:   Held exercise progression d/t incr'd knee pain today     Pain Level (0-10 scale) post treatment: 0    ASSESSMENT/Changes in Function: Some therex held d/t incr'd reports of pain today. TTP @ med knee and demo's quad ms tension. Following treatment pt reports no longer having pain, knee feels more flexible and demo'd improved gait quality. Patient will continue to benefit from skilled PT services to modify and progress therapeutic interventions, address functional mobility deficits, address ROM deficits, address strength deficits, analyze and address soft tissue restrictions, analyze and cue movement patterns, analyze and modify body mechanics/ergonomics and assess and modify postural abnormalities to attain remaining goals. []  See Plan of Care  []  See progress note/recertification  []  See Discharge Summary         Progress towards goals / Updated goals:  Short Term Goals: To be accomplished in 2 weeks:               1. The patient will demonstrate independence and compliance of HEP to maximize therapeutic benefit.              IE: issued HEP              [id number]: Pt reports compliance 1/20/2022               2. The patient will attain left knee flexion of 110 degrees to improve ease of ambulation.              IE: 100 degrees PROM              Current: 2/1/2022 - Unchanged - 100 deg      Long Term Goals: To be accomplished in 4 weeks:               1. The patient will improve FOTO score to 66 to improve quality of life.              IE: 57               2. The patient will improve left knee flexion to 120 degrees to improve ease of stair negotiation.              IE: 100 degrees               3. The patient will demonstrate single leg balance on airex to 30\" without LOB to improve ease of ambulation efficiency.             EU: 12\"                4.  The patient will demonstrate step down control with 4\" step with good quad eccentric control in order to improve ease of stair negotiation.               IE: 2\" with fair control    PLAN  []  Upgrade activities as tolerated     [x]  Continue plan of care  []  Update interventions per flow sheet       []  Discharge due to:_  []  Other:_      Manuel Kilgore PTA 2/4/2022  10:07 AM    Future Appointments   Date Time Provider Sol Ulrich   2/8/2022 10:45 AM Rosa Liu PTA [facility]

## 2022-04-13 NOTE — PROGRESS NOTES
[facility]  [address]  [place], [address]  [phone] fax    Physical Therapy Discharge Summary    Patient name: Harvey Lo Start of Care: 2022   Referral source: Shi AlejandroscalDorama : 1975                Medical Diagnosis: Left knee pain [M25.562]  Payor: [facility] MEDICAID / Plan: [facility] / Product Type: Managed Care Medicaid /  Onset Date:21                Treatment Diagnosis: Left knee pain   Prior Hospitalization: see medical history Provider#: [id number]   Medications: Verified on Patient summary List    Comorbidities: Asthma, HTN, left tibial plateau fx   Prior Level of Function: The patient reports that prior to the injury she was independent ambulator and able to run and jump. Visits from Start of Care: 4    Missed Visits: 0  Reporting Period : 2022 to 2022      Summary of Care:  Short Term Goals: To be accomplished in 2 weeks:               1. The patient will demonstrate independence and compliance of HEP to maximize therapeutic benefit.              IE: issued HEP              [id number]: Pt reports compliance 2022               2. The patient will attain left knee flexion of 110 degrees to improve ease of ambulation.              IE: 100 degrees PROM              Current: 2022 - Unchanged - 100 deg      Long Term Goals: To be accomplished in 4 weeks:               1. The patient will improve FOTO score to 66 to improve quality of life.              IE: 57               2. The patient will improve left knee flexion to 120 degrees to improve ease of stair negotiation.              IE: 100 degrees               3. The patient will demonstrate single leg balance on airex to 30\" without LOB to improve ease of ambulation efficiency.             AJ: 12\"                4.  The patient will demonstrate step down control with 4\" step with good quad eccentric control in order to improve ease of stair negotiation.               WI: 2\" with fair control    ASSESSMENT/RECOMMENDATIONS: The patient failed to return after 2/04/2022 visit, thus unable to further assess goals. Her ROM had not changed, though she demonstrated improved gait. She has been discharged due to lack of follow-up in 30 days. [x]Discontinue therapy: []Patient has reached or is progressing toward set goals      [x]Patient is non-compliant or has abdicated      []Due to lack of appreciable progress towards set goals     I certify that the above Therapy Services are being furnished while the patient is under my care.  I agree with the treatment plan and certify that this therapy is necessary.     Physician's Signature:____________Date:_________TIME:________                                         Sheryl Michael PA  ** Signature, Date and Time must be completed for valid certification **     Please sign and return to [facility] [address] [place] [address] & [facility]  [address] [facility] [address] [address]  [place], [address]  [phone] fax    Feroz Hernandez, PT 4/13/2022 8:02 AM

## 2022-09-21 ENCOUNTER — HOSPITAL ENCOUNTER (OUTPATIENT)
Dept: PHYSICAL THERAPY | Age: 47
Discharge: HOME | End: 2022-09-21
Payer: MEDICAID

## 2022-09-21 PROCEDURE — 97161 PT EVAL LOW COMPLEX 20 MIN: CPT

## 2022-09-21 NOTE — PROGRESS NOTES
PT DAILY TREATMENT NOTE     Patient Name: Pascual Hodgson  Date:2022  : 1975  [x]  Patient  Verified  Payor: [address] / Plan: [address] [facility] / Product Type: Managed Care Medicaid /    In time:141pm  Out time:207pm  Total Treatment Time (min): 36  Visit #: 1 of 16    Treatment Area: Left leg pain [M79.605]    SUBJECTIVE  Pain Level (0-10 scale): 6  Any medication changes, allergies to medications, adverse drug reactions, diagnosis change, or new procedure performed?: [x] No    [] Yes (see summary sheet for update)  Subjective functional status/changes:   [] No changes reported  See eval    OBJECTIVE    36 min [x]Eval                  []Re-Eval        With   [] TE   [] TA   [] neuro   [] other: Patient Education: [x] Review HEP    [] Progressed/Changed HEP based on:   [] positioning   [] body mechanics   [] transfers   [] heat/ice application    [] other:      Other Objective/Functional Measures: see eval     Pain Level (0-10 scale) post treatment: 6    ASSESSMENT/Changes in Function: see POC    Patient will continue to benefit from skilled PT services to modify and progress therapeutic interventions, address functional mobility deficits, address ROM deficits, address strength deficits, analyze and address soft tissue restrictions, analyze and cue movement patterns, analyze and modify body mechanics/ergonomics, assess and modify postural abnormalities, address imbalance/dizziness, and instruct in home and community integration to attain remaining goals. [x]  See Plan of Care  []  See progress note/recertification  []  See Discharge Summary         Progress towards goals / Updated goals:    Short Term Goals: To be accomplished in 2 weeks:  Pt will report HEP compliance to maximize therapeutic outcome attainment. Eval: HEP assigned  Pt will demonstrate 0-120 deg left knee AROM to improve mobility with ambulation. Eval: 5-110 deg left knee  Long Term Goals:  To be accomplished in 8 weeks:  Pt will improve her B knee strength to 4+/5 MMT to maximize stability with stair negotiation. Eval: 4-/5 right knee, 3+/5 left knee  Pt will improve her B hip ext/abd strength to 4/5 MMT to improve stability with ambulation. Eval: hip ext 3/5 right and 2+/5 left, hip abd 3+/5 right and 3/5 left  Pt will improve her left LE SLS to 30 seconds to improve stability in the stance phase of gait. Eval: 7 sec  Pt will improve her left ely stretch to 6 inches from heel to buttock to reduce anterior knee pain. Eval: ~ 90 deg flexion in prone  Pt will improve her FOTO to 56, demonstrating improved functional ADL capacity. Eval: 45  Pt will report ability to negotiate 1 flight of stairs reciprocally without instability to improve confidence with community ambulation.   Eval: step to and anxious    PLAN  []  Upgrade activities as tolerated     [x]  Continue plan of care  []  Update interventions per flow sheet       []  Discharge due to:_  []  Other:_      Grady Medina, PT 9/21/2022  2:17 PM    Future Appointments   Date Time Provider Sol Mojgan   9/26/2022  4:45 PM Seb Robertore, PTA [facility]   9/30/2022  4:30 PM Teresia Dry [facility]   10/3/2022  4:45 PM Seb Robertore, PTA [facility]   10/6/2022  3:45 PM Tasha Hence [facility]   10/10/2022  1:15 PM Seb Moraes, PTA [facility]   10/12/2022  4:30 PM Fredis Coates, PT [facility]   10/17/2022  4:30 PM Chris, [address] [facility]   10/19/2022  4:30 PM Fredis Coates, PT [facility]   10/24/2022  4:30 PM Tasha Hence [facility]   10/26/2022  1:00 PM Tasha Hence [facility]

## 2022-09-21 NOTE — PROGRESS NOTES
[facility]  [address]  [place], [address]  [phone] fax    Plan of Care/ Statement of Necessity for Physical Therapy Services    Patient name: Harris Vital Start of Care: 2022   Referral source: Victor M Ryder MD : 1975    Medical Diagnosis: Left leg pain [M79.605]  Payor: [address] / Plan: [address] / Product Type: Managed Care Medicaid /  Onset Date:2021    Treatment Diagnosis: left knee pain   Prior Hospitalization: see medical history Provider#: [id number]   Medications: Verified on Patient summary List    Comorbidities: Asthma, HTN, left tibial plateau fx   Prior Level of Function: The patient reports that prior to the injury she was independent ambulator and able to run and jump. The Plan of Care and following information is based on the information from the initial evaluation. Assessment/ key information: Pt is a 40-year-old woman presenting to the clinic with c/o left knee and lower leg pain, swelling, and instability. She reports that the knee will \"give out\" at times with walking and with stairs. Pt has had a decreased tolerance to walking, stairs, and standing. She demonstrates decreased B hip and knee strength, 1cm of midpatella swelling, reduced single leg stability, decreased HS and quadriceps stability, (+) left hip pain, and tenderness to mobilization of the left knee. She demonstrates 5-110 deg of left knee AROM and 0-120 deg PROM. She has been to PT for this condition twice before, and was limited in progress by insurance restrictions and transportation. Signs and sx are consistent with generalized muscle weakness and pain from long standing limitations s/p tibial plateau fx with fixation. Pt will benefit from skilled PT services to address the aforementioned impairments and improve stability.    Evaluation Complexity History MEDIUM  Complexity : 1-2 comorbidities / personal factors will impact the outcome/ POC ; Examination MEDIUM Complexity : 3 Standardized tests and measures addressing body structure, function, activity limitation and / or participation in recreation  ;Presentation LOW Complexity : Stable, uncomplicated  ;Clinical Decision Making MEDIUM Complexity : FOTO score of 26-74  Overall Complexity Rating: LOW   Problem List: pain affecting function, decrease ROM, decrease strength, edema affecting function, impaired gait/ balance, decrease ADL/ functional abilitiies, decrease activity tolerance, decrease flexibility/ joint mobility, and decrease transfer abilities   Treatment Plan may include any combination of the following: Therapeutic exercise, Therapeutic activities, Neuromuscular re-education, Physical agent/modality, Gait/balance training, Manual therapy, Patient education, Self Care training, Functional mobility training, Home safety training, and Stair training  Patient / Family readiness to learn indicated by: asking questions, trying to perform skills, and interest  Persons(s) to be included in education: patient (P)  Barriers to Learning/Limitations: None  Patient Goal (s): get it right  Patient Self Reported Health Status: fair  Rehabilitation Potential: fair    Short Term Goals: To be accomplished in 2 weeks:  Pt will report HEP compliance to maximize therapeutic outcome attainment. Pt will demonstrate 0-120 deg left knee AROM to improve mobility with ambulation. Long Term Goals: To be accomplished in 8 weeks:  Pt will improve her B knee strength to 4+/5 MMT to maximize stability with stair negotiation. Pt will improve her B hip ext/abd strength to 4/5 MMT to improve stability with ambulation. Pt will improve her left LE SLS to 30 seconds to improve stability in the stance phase of gait. Pt will improve her left ely stretch to 6 inches from heel to buttock to reduce anterior knee pain.   Pt will improve her FOTO to 56, demonstrating improved functional ADL capacity. Pt will report ability to negotiate 1 flight of stairs reciprocally without instability to improve confidence with community ambulation. Frequency / Duration: Patient to be seen 2 times per week for 8 weeks. Patient/ Caregiver education and instruction: Diagnosis, prognosis, self care, activity modification, and exercises   [x]  Plan of care has been reviewed with NACHO Norton, PT 9/21/2022 1:31 PM    ________________________________________________________________________    I certify that the above Therapy Services are being furnished while the patient is under my care. I agree with the treatment plan and certify that this therapy is necessary.     [de-identified] Signature:____________Date:_________TIME:________     King Gonzalez MD  ** Signature, Date and Time must be completed for valid certification **    Please sign and return to [facility] [address] [place] [address] & [facility]  [address]  [place], [facility] [address]  [phone] fax

## 2022-09-26 ENCOUNTER — TELEPHONE (OUTPATIENT)
Dept: PHYSICAL THERAPY | Age: 47
End: 2022-09-26

## 2022-09-30 ENCOUNTER — HOSPITAL ENCOUNTER (OUTPATIENT)
Dept: PHYSICAL THERAPY | Age: 47
Discharge: HOME | End: 2022-09-30
Payer: MEDICAID

## 2022-09-30 PROCEDURE — 97530 THERAPEUTIC ACTIVITIES: CPT

## 2022-09-30 PROCEDURE — 97112 NEUROMUSCULAR REEDUCATION: CPT

## 2022-09-30 PROCEDURE — 97110 THERAPEUTIC EXERCISES: CPT

## 2022-09-30 NOTE — PROGRESS NOTES
PT DAILY TREATMENT NOTE     Patient Name: Kevin Philippe  Date:2022  : 1975  [x]  Patient  Verified  Payor: [address] / Plan: [address] [facility] / Product Type: Managed Care Medicaid /    In time:3:45  Out time:4:13  Total Treatment Time (min): 28  Visit #: 2 of 16    Treatment Area: Left leg pain [M79.605]    SUBJECTIVE  Pain Level (0-10 scale): 8  Any medication changes, allergies to medications, adverse drug reactions, diagnosis change, or new procedure performed?: [x] No    [] Yes (see summary sheet for update)  Subjective functional status/changes:   [] No changes reported  \"Its a bad day today. I guess the weather, but it hasn't been hurting like this\"    OBJECTIVE      12 min Therapeutic Exercise:  [] See flow sheet :   Rationale: increase ROM and increase strength to improve the patients ability to perform daily tasks     8 min Therapeutic Activity:  []  See flow sheet : step ups/squats   Rationale: increase strength and improve coordination  to improve the patients ability to perform functional tasks with improved efficiency     8 min Neuromuscular Re-education:  []  See flow sheet :   Rationale: increase strength and increase proprioception  to improve the patients ability to perform functional tasks with improved stability            With   [] TE   [] TA   [] neuro   [] other: Patient Education: [x] Review HEP    [] Progressed/Changed HEP based on:   [] positioning   [] body mechanics   [] transfers   [] heat/ice application    [] other:      Other Objective/Functional Measures:      Pain Level (0-10 scale) post treatment: 8    ASSESSMENT/Changes in Function: The pt reports increased pain today upon entering, thinking maybe just the weather as she has not been experiencing this. She was able to perform some standing interventions but held step downs and SLS today due to pt reporting increased pain.  She performed a few table interventions and then requested to terminate session due to pain. Will attempt to progress through more of flowsheet next visit if pain allows    Patient will continue to benefit from skilled PT services to modify and progress therapeutic interventions, address functional mobility deficits, address ROM deficits, address strength deficits, analyze and address soft tissue restrictions, analyze and cue movement patterns, analyze and modify body mechanics/ergonomics, and address imbalance/dizziness to attain remaining goals. []  See Plan of Care  []  See progress note/recertification  []  See Discharge Summary         Progress towards goals / Updated goals:  Short Term Goals: To be accomplished in 2 weeks:  Pt will report HEP compliance to maximize therapeutic outcome attainment. Eval: HEP assigned  Current: Met pt reports compliance 9/29/2022  Pt will demonstrate 0-120 deg left knee AROM to improve mobility with ambulation. Eval: 5-110 deg left knee  Long Term Goals: To be accomplished in 8 weeks:  Pt will improve her B knee strength to 4+/5 MMT to maximize stability with stair negotiation. Eval: 4-/5 right knee, 3+/5 left knee  Pt will improve her B hip ext/abd strength to 4/5 MMT to improve stability with ambulation. Eval: hip ext 3/5 right and 2+/5 left, hip abd 3+/5 right and 3/5 left  Pt will improve her left LE SLS to 30 seconds to improve stability in the stance phase of gait. Eval: 7 sec  Pt will improve her left ely stretch to 6 inches from heel to buttock to reduce anterior knee pain. Eval: ~ 90 deg flexion in prone  Pt will improve her FOTO to 56, demonstrating improved functional ADL capacity. Eval: 45  Pt will report ability to negotiate 1 flight of stairs reciprocally without instability to improve confidence with community ambulation.   Eval: step to and anxious    PLAN  []  Upgrade activities as tolerated     [x]  Continue plan of care  []  Update interventions per flow sheet       []  Discharge due to:_  []  Other:_ Daja Vela DPT CMTPT 9/30/2022  3:52 PM    Future Appointments   Date Time Provider Sol Ulrich   10/3/2022  4:45 PM Delorise Booze, PTA [facility]   10/6/2022  3:45 PM Montgomery Hock [facility]   10/10/2022  1:15 PM Delorise Booze, PTA [facility]   10/12/2022  4:30 PM Candace Field, PT [facility]   10/17/2022  4:30 PM Montgomery Hock [facility]   10/19/2022  4:30 PM Candace Field, PT [facility]   10/24/2022  4:30 PM Montgomery Hock [facility]   10/26/2022  1:00 PM Regina Hock [facility]

## 2022-10-03 ENCOUNTER — HOSPITAL ENCOUNTER (OUTPATIENT)
Dept: PHYSICAL THERAPY | Age: 47
Discharge: HOME | End: 2022-10-03
Payer: MEDICAID

## 2022-10-03 PROCEDURE — 97112 NEUROMUSCULAR REEDUCATION: CPT

## 2022-10-03 PROCEDURE — 97110 THERAPEUTIC EXERCISES: CPT

## 2022-10-03 PROCEDURE — 97530 THERAPEUTIC ACTIVITIES: CPT

## 2022-10-06 ENCOUNTER — APPOINTMENT (OUTPATIENT)
Dept: PHYSICAL THERAPY | Age: 47
End: 2022-10-06
Payer: MEDICAID

## 2022-10-06 ENCOUNTER — TELEPHONE (OUTPATIENT)
Dept: PHYSICAL THERAPY | Age: 47
End: 2022-10-06

## 2022-10-07 ENCOUNTER — HOSPITAL ENCOUNTER (EMERGENCY)
Age: 47
Discharge: LEFT BEFORE DISCHARGE | End: 2022-10-07
Payer: MEDICAID

## 2022-10-07 ENCOUNTER — APPOINTMENT (OUTPATIENT)
Dept: GENERAL RADIOLOGY | Age: 47
End: 2022-10-07
Attending: PHYSICIAN ASSISTANT
Payer: MEDICAID

## 2022-10-07 VITALS
RESPIRATION RATE: 16 BRPM | HEART RATE: 78 BPM | DIASTOLIC BLOOD PRESSURE: 90 MMHG | OXYGEN SATURATION: 100 % | TEMPERATURE: 98.4 F | SYSTOLIC BLOOD PRESSURE: 167 MMHG

## 2022-10-07 LAB
ALBUMIN SERPL-MCNC: 3.9 G/DL (ref 3.4–5)
ALBUMIN/GLOB SERPL: 1 {RATIO} (ref 0.8–1.7)
ALP SERPL-CCNC: 58 U/L (ref 45–117)
ALT SERPL-CCNC: 22 U/L (ref 13–56)
ANION GAP SERPL CALC-SCNC: 10 MMOL/L (ref 3–18)
APPEARANCE UR: CLEAR
AST SERPL-CCNC: 17 U/L (ref 10–38)
BASOPHILS # BLD: 0.1 K/UL (ref 0–0.1)
BASOPHILS NFR BLD: 1 % (ref 0–2)
BILIRUB SERPL-MCNC: 0.7 MG/DL (ref 0.2–1)
BILIRUB UR QL: NEGATIVE
BNP SERPL-MCNC: 12 PG/ML (ref 0–450)
BUN SERPL-MCNC: 15 MG/DL (ref 7–18)
BUN/CREAT SERPL: 18 (ref 12–20)
CALCIUM SERPL-MCNC: 9.3 MG/DL (ref 8.5–10.1)
CHLORIDE SERPL-SCNC: 105 MMOL/L (ref 100–111)
CO2 SERPL-SCNC: 23 MMOL/L (ref 21–32)
COLOR UR: YELLOW
CREAT SERPL-MCNC: 0.83 MG/DL (ref 0.6–1.3)
DIFFERENTIAL METHOD BLD: ABNORMAL
EOSINOPHIL # BLD: 0.1 K/UL (ref 0–0.4)
EOSINOPHIL NFR BLD: 1 % (ref 0–5)
ERYTHROCYTE [DISTWIDTH] IN BLOOD BY AUTOMATED COUNT: 17.2 % (ref 11.6–14.5)
GLOBULIN SER CALC-MCNC: 3.9 G/DL (ref 2–4)
GLUCOSE SERPL-MCNC: 86 MG/DL (ref 74–99)
GLUCOSE UR STRIP.AUTO-MCNC: NEGATIVE MG/DL
HCG UR QL: NEGATIVE
HCT VFR BLD AUTO: 33.2 % (ref 35–45)
HGB BLD-MCNC: 11.1 G/DL (ref 12–16)
HGB UR QL STRIP: NEGATIVE
IMM GRANULOCYTES # BLD AUTO: 0 K/UL (ref 0–0.04)
IMM GRANULOCYTES NFR BLD AUTO: 0 % (ref 0–0.5)
KETONES UR QL STRIP.AUTO: NEGATIVE MG/DL
LEUKOCYTE ESTERASE UR QL STRIP.AUTO: NEGATIVE
LIPASE SERPL-CCNC: 133 U/L (ref 73–393)
LYMPHOCYTES # BLD: 3.6 K/UL (ref 0.9–3.6)
LYMPHOCYTES NFR BLD: 31 % (ref 21–52)
MAGNESIUM SERPL-MCNC: 1.9 MG/DL (ref 1.6–2.6)
MCH RBC QN AUTO: 23.6 PG (ref 24–34)
MCHC RBC AUTO-ENTMCNC: 33.4 G/DL (ref 31–37)
MCV RBC AUTO: 70.6 FL (ref 78–100)
MONOCYTES # BLD: 0.9 K/UL (ref 0.05–1.2)
MONOCYTES NFR BLD: 7 % (ref 3–10)
NEUTS SEG # BLD: 7 K/UL (ref 1.8–8)
NEUTS SEG NFR BLD: 60 % (ref 40–73)
NITRITE UR QL STRIP.AUTO: NEGATIVE
NRBC # BLD: 0 K/UL (ref 0–0.01)
NRBC BLD-RTO: 0 PER 100 WBC
PH UR STRIP: 5.5 [PH] (ref 5–8)
PLATELET # BLD AUTO: 517 K/UL (ref 135–420)
PMV BLD AUTO: 9.6 FL (ref 9.2–11.8)
POTASSIUM SERPL-SCNC: 3.8 MMOL/L (ref 3.5–5.5)
PROT SERPL-MCNC: 7.8 G/DL (ref 6.4–8.2)
PROT UR STRIP-MCNC: NEGATIVE MG/DL
RBC # BLD AUTO: 4.7 M/UL (ref 4.2–5.3)
SODIUM SERPL-SCNC: 138 MMOL/L (ref 136–145)
SP GR UR REFRACTOMETRY: 1 (ref 1–1.03)
TROPONIN-HIGH SENSITIVITY: 6 NG/L (ref 0–54)
UROBILINOGEN UR QL STRIP.AUTO: 0.2 EU/DL (ref 0.2–1)
WBC # BLD AUTO: 11.6 K/UL (ref 4.6–13.2)

## 2022-10-07 PROCEDURE — 83690 ASSAY OF LIPASE: CPT

## 2022-10-07 PROCEDURE — 83735 ASSAY OF MAGNESIUM: CPT

## 2022-10-07 PROCEDURE — 75810000275 HC EMERGENCY DEPT VISIT NO LEVEL OF CARE

## 2022-10-07 PROCEDURE — 81003 URINALYSIS AUTO W/O SCOPE: CPT

## 2022-10-07 PROCEDURE — 83880 ASSAY OF NATRIURETIC PEPTIDE: CPT

## 2022-10-07 PROCEDURE — 93005 ELECTROCARDIOGRAM TRACING: CPT

## 2022-10-07 PROCEDURE — 81025 URINE PREGNANCY TEST: CPT

## 2022-10-07 PROCEDURE — 84484 ASSAY OF TROPONIN QUANT: CPT

## 2022-10-07 PROCEDURE — 80053 COMPREHEN METABOLIC PANEL: CPT

## 2022-10-07 PROCEDURE — 71045 X-RAY EXAM CHEST 1 VIEW: CPT

## 2022-10-07 PROCEDURE — 85025 COMPLETE CBC W/AUTO DIFF WBC: CPT

## 2022-10-08 LAB
ATRIAL RATE: 97 BPM
CALCULATED P AXIS, ECG09: 58 DEGREES
CALCULATED T AXIS, ECG11: 45 DEGREES
DIAGNOSIS, 93000: NORMAL
P-R INTERVAL, ECG05: 164 MS
Q-T INTERVAL, ECG07: 342 MS
QRS DURATION, ECG06: 74 MS
QTC CALCULATION (BEZET), ECG08: 434 MS
VENTRICULAR RATE, ECG03: 97 BPM

## 2022-10-08 NOTE — ED TRIAGE NOTES
Pt via wheelchair to triage c/o headache that started today. Also reports her right foot \"locked up\" and became numb and she noticed swelling and pain up to her right hip that also started today. States substernal chest tightness that started shortly thereafter associated with shortness of breath.

## 2022-10-08 NOTE — ED NOTES
Patient states she has a family emergency and needed to leave. IV pulled and verbalized understanding of leaving.

## 2022-10-10 ENCOUNTER — HOSPITAL ENCOUNTER (OUTPATIENT)
Dept: PHYSICAL THERAPY | Age: 47
Discharge: HOME | End: 2022-10-10
Payer: MEDICAID

## 2022-10-10 PROCEDURE — 97112 NEUROMUSCULAR REEDUCATION: CPT

## 2022-10-10 PROCEDURE — 97530 THERAPEUTIC ACTIVITIES: CPT

## 2022-10-10 PROCEDURE — 97110 THERAPEUTIC EXERCISES: CPT

## 2022-10-10 NOTE — PROGRESS NOTES
PT DAILY TREATMENT NOTE     Patient Name: Nickolas Cardoza  Date:10/10/2022  : 1975  [x]  Patient  Verified  Payor: Blayne Boudreaux / Plan: [address] [place] [address] / Product Type: Managed Care Medicaid /    In time:1:18  Out time:1:58  Total Treatment Time (min): 40  Visit #: 4 of 8    Medicare/BCBS Only   Total Timed Codes (min):  40 1:1 Treatment Time:  40       Treatment Area: Left leg pain [M79.605]    SUBJECTIVE  Pain Level (0-10 scale): 4/10  Any medication changes, allergies to medications, adverse drug reactions, diagnosis change, or new procedure performed?: [x] No    [] Yes (see summary sheet for update)  Subjective functional status/changes:   [] No changes reported  Pt reports no new complaints of pain. OBJECTIVE    20 min Therapeutic Exercise:  [x] See flow sheet :   Rationale: increase ROM and increase strength to improve the patients ability to perform ADLs with increased ease. 10 min Therapeutic Activity:  [x]  See flow sheet :   Rationale: increase strength, improve coordination, and increase proprioception  to improve the patients ability to perform ADLs with increased ease. 10 min Neuromuscular Re-education:  [x]  See flow sheet :   Rationale: increase strength, improve coordination, and increase proprioception  to improve the patients ability to perform functional activities with increased ease. With   [] TE   [] TA   [] neuro   [] other: Patient Education: [x] Review HEP    [] Progressed/Changed HEP based on:   [] positioning   [] body mechanics   [] transfers   [] heat/ice application    [] other:      Other Objective/Functional Measures: Held exercises as per flow sheet due to time constraints and patients report of increased pain in left knee. Pain Level (0-10 scale) post treatment: 6/10    ASSESSMENT/Changes in Function: Though patient has increased pain post treatment she declined MHP/CP due to time constraints today.  Pt demonstrates quad lag with supine SLR. Patient will continue to benefit from skilled PT services to modify and progress therapeutic interventions, address functional mobility deficits, address ROM deficits, address strength deficits, analyze and address soft tissue restrictions, analyze and cue movement patterns, analyze and modify body mechanics/ergonomics, and assess and modify postural abnormalities to attain remaining goals. []  See Plan of Care  []  See progress note/recertification  []  See Discharge Summary         Progress towards goals / Updated goals:  Short Term Goals: To be accomplished in 2 weeks:  Pt will report HEP compliance to maximize therapeutic outcome attainment. Eval: HEP assigned  Current: Met pt reports compliance 9/29/2022  Pt will demonstrate 0-120 deg left knee AROM to improve mobility with ambulation. Eval: 5-110 deg left knee  Long Term Goals: To be accomplished in 8 weeks:  Pt will improve her B knee strength to 4+/5 MMT to maximize stability with stair negotiation. Eval: 4-/5 right knee, 3+/5 left knee  Pt will improve her B hip ext/abd strength to 4/5 MMT to improve stability with ambulation. Eval: hip ext 3/5 right and 2+/5 left, hip abd 3+/5 right and 3/5 left  Pt will improve her left LE SLS to 30 seconds to improve stability in the stance phase of gait. Eval: 7 sec  Pt will improve her left ely stretch to 6 inches from heel to buttock to reduce anterior knee pain. Eval: ~ 90 deg flexion in prone  Pt will improve her FOTO to 56, demonstrating improved functional ADL capacity. Eval: 45  Pt will report ability to negotiate 1 flight of stairs reciprocally without instability to improve confidence with community ambulation.   Eval: step to and anxious    PLAN  []  Upgrade activities as tolerated     [x]  Continue plan of care  []  Update interventions per flow sheet       []  Discharge due to:_  []  Other:_      George Persons, PTA 10/10/2022  1:22 PM    Future Appointments   Date Time Provider Sol Ulrich   10/12/2022  4:30 PM Cherylyn Panguitch, PT [facility]   10/17/2022  4:30 PM Loraine Prey [facility]   10/19/2022  4:30 PM Cherylyn Panguitch, PT [facility]   10/24/2022  4:30 PM Loraine Prey [facility]   10/26/2022  1:00 PM Loraine Prey [facility]

## 2022-10-17 ENCOUNTER — TELEPHONE (OUTPATIENT)
Dept: PHYSICAL THERAPY | Age: 47
End: 2022-10-17

## 2022-10-19 ENCOUNTER — TELEPHONE (OUTPATIENT)
Dept: PHYSICAL THERAPY | Age: 47
End: 2022-10-19

## 2022-10-20 NOTE — PROGRESS NOTES
[facility]  [address] [facility] [address]  [place], [address]  [phone] fax    Physical Therapy Discharge Summary  Patient name: Sumi Garcia Start of Care: 2022   Referral source: Sukhjinder Padilla MD : 1975   Medical/Treatment Diagnosis: Left leg pain [M79.605]  Payor: [facility] MEDICAID / Plan: [address] [facility] / Product Type: Managed Care Medicaid /  Onset Date:2021     Prior Hospitalization: see medical history Provider#: [id number]   Medications: Verified on Patient Summary List    Comorbidities: Asthma, HTN, left tibial plateau fx   Prior Level of Function: The patient reports that prior to the injury she was independent ambulator and able to run and jump. Visits from Start of Care: 4    Missed Visits: 4  Reporting Period : 2022 to 10/10/2022    Summary of Care:  Short Term Goals: To be accomplished in 2 weeks:  Pt will report HEP compliance to maximize therapeutic outcome attainment. Eval: HEP assigned  Current: Met pt reports compliance 2022  Pt will demonstrate 0-120 deg left knee AROM to improve mobility with ambulation. Eval: 5-110 deg left knee  Long Term Goals: To be accomplished in 8 weeks:  Pt will improve her B knee strength to 4+/5 MMT to maximize stability with stair negotiation. Eval: 4-/5 right knee, 3+/5 left knee  Pt will improve her B hip ext/abd strength to 4/5 MMT to improve stability with ambulation. Eval: hip ext 3/5 right and 2+/5 left, hip abd 3+/5 right and 3/5 left  Pt will improve her left LE SLS to 30 seconds to improve stability in the stance phase of gait. Eval: 7 sec  Pt will improve her left ely stretch to 6 inches from heel to buttock to reduce anterior knee pain. Eval: ~ 90 deg flexion in prone  Pt will improve her FOTO to 56, demonstrating improved functional ADL capacity.   Eval: 45  Pt will report ability to negotiate 1 flight of stairs reciprocally without instability to improve confidence with community ambulation. Eval: step to and anxious      ASSESSMENT/RECOMMENDATIONS:  Pt has missed 4 visits and is being discharged due to the attendance policy. This is the third time since her initial surgery in 2021. When called, she said she was in the hospital and had cardiac surgery. She was informed that that is a major change in status and that she will need to get re-evaluated by a doctor and re-referred. Goals unable to be re-assessed. [x]Discontinue therapy: []Patient has reached or is progressing toward set goals      [x]Patient is non-compliant or has abdicated      []Due to lack of appreciable progress towards set goals    Liza Andre, PT 10/20/2022 2:13 PM    NOTE TO PHYSICIAN:  Please complete the following and fax to: [facility] at [facility] at [phone]  . Retain this original for your records. If you are unable to process this request in   24 hours, please contact our office.      [] I have read the above report and request that my patient continue therapy with the following changes/special instructions:  [] I have read the above report and request that my patient be discharged from therapy    Physician's Signature:____________Date:_________TIME:________     Bear Quezada MD  ** Signature, Date and Time must be completed for valid certification **

## 2022-10-24 ENCOUNTER — APPOINTMENT (OUTPATIENT)
Dept: PHYSICAL THERAPY | Age: 47
End: 2022-10-24
Payer: MEDICAID

## 2022-10-26 ENCOUNTER — APPOINTMENT (OUTPATIENT)
Dept: PHYSICAL THERAPY | Age: 47
End: 2022-10-26
Payer: MEDICAID

## 2023-09-12 ENCOUNTER — HOSPITAL ENCOUNTER (EMERGENCY)
Facility: HOSPITAL | Age: 48
Discharge: HOME | End: 2023-09-12
Payer: COMMERCIAL

## 2023-09-12 ENCOUNTER — APPOINTMENT (OUTPATIENT)
Facility: HOSPITAL | Age: 48
End: 2023-09-12
Payer: COMMERCIAL

## 2023-09-12 VITALS
RESPIRATION RATE: 16 BRPM | HEIGHT: 60 IN | DIASTOLIC BLOOD PRESSURE: 76 MMHG | WEIGHT: 142 LBS | BODY MASS INDEX: 27.88 KG/M2 | HEART RATE: 87 BPM | SYSTOLIC BLOOD PRESSURE: 128 MMHG | OXYGEN SATURATION: 98 % | TEMPERATURE: 98.7 F
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
## 2023-09-12 DIAGNOSIS — M25.562 ACUTE PAIN OF LEFT KNEE: ICD-10-CM

## 2023-09-12 PROCEDURE — 73700 CT LOWER EXTREMITY W/O DYE: CPT

## 2023-09-12 PROCEDURE — 73562 X-RAY EXAM OF KNEE 3: CPT

## 2023-09-12 PROCEDURE — 73590 X-RAY EXAM OF LOWER LEG: CPT

## 2023-09-12 PROCEDURE — 73502 X-RAY EXAM HIP UNI 2-3 VIEWS: CPT

## 2023-09-12 PROCEDURE — 99284 EMERGENCY DEPT VISIT MOD MDM: CPT

## 2023-09-12 PROCEDURE — 6370000000 HC RX 637 (ALT 250 FOR IP)

## 2023-09-12 RX ORDER — HYDROCODONE BITARTRATE AND ACETAMINOPHEN 5; 325 MG/1; MG/1
1 TABLET ORAL EVERY 8 HOURS PRN
Qty: 12 TABLET | Refills: 0 | Status: SHIPPED | OUTPATIENT
Start: 2023-09-12 | End: 2023-09-16

## 2023-09-12 RX ORDER — OXYCODONE HYDROCHLORIDE AND ACETAMINOPHEN 5; 325 MG/1; MG/1
1 TABLET ORAL
Status: COMPLETED | OUTPATIENT
Start: 2023-09-12 | End: 2023-09-12

## 2023-09-12 RX ADMIN — OXYCODONE HYDROCHLORIDE AND ACETAMINOPHEN 1 TABLET: 5; 325 TABLET ORAL at 12:57

## 2023-09-12 ASSESSMENT — PAIN DESCRIPTION - ORIENTATION: ORIENTATION: LEFT

## 2023-09-12 ASSESSMENT — PAIN DESCRIPTION - LOCATION
LOCATION: KNEE
LOCATION: TOE (COMMENT WHICH ONE)

## 2023-09-12 ASSESSMENT — ENCOUNTER SYMPTOMS
SHORTNESS OF BREATH: 0
NAUSEA: 0
DIARRHEA: 0
CHEST TIGHTNESS: 0
VOMITING: 0
CONSTIPATION: 0
ABDOMINAL PAIN: 0
COUGH: 0

## 2023-09-12 ASSESSMENT — PAIN DESCRIPTION - DESCRIPTORS: DESCRIPTORS: ACHING;THROBBING

## 2023-09-12 ASSESSMENT — PAIN SCALES - GENERAL
PAINLEVEL_OUTOF10: 10
PAINLEVEL_OUTOF10: 9

## 2023-09-12 ASSESSMENT — PAIN - FUNCTIONAL ASSESSMENT
PAIN_FUNCTIONAL_ASSESSMENT: ACTIVITIES ARE NOT PREVENTED
PAIN_FUNCTIONAL_ASSESSMENT: 0-10

## 2023-09-12 NOTE — ED PROVIDER NOTES
EMERGENCY DEPARTMENT HISTORY AND PHYSICAL EXAM    7:31 PM      Date: 9/12/2023  Patient Name: Mendel Gather    History of Presenting Illness     Chief Complaint   Patient presents with    Knee Pain         History Provided By: the patient. Additional History (Context): Mendel Gather is a 50 y.o. female with a history of asthma and sickle cell trait presenting to the emergency department due to left knee pain. Patient states that she was getting out of the shower when she slipped and her knee twisted and she fell down onto it. Denies hitting her head or loss of consciousness. Denies headache, dizziness, lightheadedness, vision changes. States that the majority of her pain is in her left knee and it goes down her leg. Patient reports that she has a history of prior surgery to the knee. Attempted to take Motrin and aspirin with no relief. Denies any other injuries. Denies chest pain or shortness of breath. Denies fever or chills. PCP: No primary care provider on file. No current facility-administered medications for this encounter. Current Outpatient Medications   Medication Sig Dispense Refill    HYDROcodone-acetaminophen (NORCO) 5-325 MG per tablet Take 1 tablet by mouth every 8 hours as needed for Pain for up to 4 days. Intended supply: 3 days. Take lowest dose possible to manage pain Max Daily Amount: 3 tablets 12 tablet 0    amLODIPine (NORVASC) 2.5 MG tablet Take 2.5 mg by mouth daily      lisinopril (PRINIVIL;ZESTRIL) 5 MG tablet Take 5 mg by mouth daily         Past History     Past Medical History:  Past Medical History:   Diagnosis Date    Asthma     Sickle cell trait ([address])        Past Surgical History:  No past surgical history on file.     Family History:  Family History   Problem Relation Age of Onset    Hypertension Father     Diabetes Father     Hypertension Mother     Diabetes Mother     Stroke Father        Social History:  Social History     Tobacco Use    Smoking status:

## 2023-09-12 NOTE — ED NOTES
Pt given knee immobilizer and applied to left knee. Crutches set to 5'5 instructions given pt demonstrated use.      Kolby Elena RN  09/12/23 [id number]

## 2023-09-12 NOTE — ED TRIAGE NOTES
Pt c/o left knee and leg pain s/p fall this am , pt states she was trying to get out of shower when she slipped and fell on her left knee. Pt states she took 800 mg motrin and asa with no pain relief.

## 2023-09-12 NOTE — DISCHARGE INSTRUCTIONS
Please leave knee immobilizer in place until you are reevaluated by the orthopedic team.  Contact Dr. Alfred Tracy within the next 2 days in order to be reevaluated. Rest, ice, compress, and elevate the affected leg in order to decrease pain and swelling. Begin taking Norco as needed for pain. You can also take ibuprofen to help with inflammation. Return to the ED with any new or worsening symptoms.

## 2024-05-11 NOTE — DISCHARGE INSTRUCTIONS
Continue home [facility]   Neck Strain: Care Instructions  Your Care Instructions    You have strained the muscles and ligaments in your neck. A sudden, awkward movement can strain the neck. This often occurs with falls or car accidents or during certain sports. Everyday activities like working on a computer or sleeping can also cause neck strain if they force you to hold your neck in an awkward position for a long time. It is common for neck pain to get worse for a day or two after an injury, but it should start to feel better after that. You may have more pain and stiffness for several days before it gets better. This is expected. It may take a few weeks or longer for it to heal completely. Good home treatment can help you get better faster and avoid future neck problems. Follow-up care is a key part of your treatment and safety. Be sure to make and go to all appointments, and call your doctor if you are having problems. It's also a good idea to know your test results and keep a list of the medicines you take. How can you care for yourself at home? · If you were given a neck brace (cervical collar) to limit neck motion, wear it as instructed for as many days as your doctor tells you to. Do not wear it longer than you were told to. Wearing a brace for too long can make neck stiffness worse and weaken the neck muscles. · You can try using heat or ice to see if it helps. ¨ Try using a heating pad on a low or medium setting for 15 to 20 minutes every 2 to 3 hours. Try a warm shower in place of one session with the heating pad. You can also buy single-use heat wraps that last up to 8 hours. ¨ You can also try an ice pack for 10 to 15 minutes every 2 to 3 hours. · Take pain medicines exactly as directed. ¨ If the doctor gave you a prescription medicine for pain, take it as prescribed. ¨ If you are not taking a prescription pain medicine, ask your doctor if you can take an over-the-counter medicine.   · Gently rub the area to relieve pain and help with blood flow. Do not massage the area if it hurts to do so. · Do not do anything that makes the pain worse. Take it easy for a couple of days. You can do your usual activities if they do not hurt your neck or put it at risk for more stress or injury. · Try sleeping on a special neck pillow. Place it under your neck, not under your head. Placing a tightly rolled-up towel under your neck while you sleep will also work. If you use a neck pillow or rolled towel, do not use your regular pillow at the same time. · To prevent future neck pain, do exercises to stretch and strengthen your neck and back. Learn how to use good posture, safe lifting techniques, and proper body mechanics. When should you call for help? Call 911 anytime you think you may need emergency care. For example, call if:  ? · You are unable to move an arm or a leg at all. ?Call your doctor now or seek immediate medical care if:  ? · You have new or worse symptoms in your arms, legs, chest, belly, or buttocks. Symptoms may include:  ¨ Numbness or tingling. ¨ Weakness. ¨ Pain. ? · You lose bladder or bowel control. ? Watch closely for changes in your health, and be sure to contact your doctor if:  ? · You are not getting better as expected. Where can you learn more? Go to http://suzanne-simran.info/. Enter M253 in the search box to learn more about \"Neck Strain: Care Instructions. \"  Current as of: March 21, 2017  Content Version: 11.4  © 6577-7407 [employer]. Care instructions adapted under license by [employer] (which disclaims liability or warranty for this information). If you have questions about a medical condition or this instruction, always ask your healthcare professional. [place] [address] any warranty or liability for your use of this information.          Muscle Cramps: Care Instructions  Your Care Instructions    A muscle cramp occurs when a muscle tightens up suddenly. A cramp often happens in the legs. A muscle cramp is also called a muscle spasm or a charley horse. Muscle cramps usually last less than a minute. However, the pain may last for several minutes. Leg cramps that occur at night may wake you up. Heavy exercise, dehydration, and being overweight can increase your risk of getting cramps. An imbalance of certain chemicals in your blood, called electrolytes, can also lead to muscle cramps. Pregnant women sometimes get muscle cramps during sleep. Muscle cramps can be treated by stretching and massaging the muscle. If cramps keep coming back, your doctor may prescribe medicine that relaxes your muscles. Follow-up care is a key part of your treatment and safety. Be sure to make and go to all appointments, and call your doctor if you are having problems. It's also a good idea to know your test results and keep a list of the medicines you take. How can you care for yourself at home? · Drink plenty of fluids to prevent dehydration. Choose water and other caffeine-free clear liquids until you feel better. If you have kidney, heart, or liver disease and have to limit fluids, talk with your doctor before you increase the amount of fluids you drink. · Stretch your muscles every day, especially before and after exercise and at bedtime. Regular stretching can relax your muscles and may prevent cramps. · Do not suddenly increase the amount of exercise you get. Increase your exercise a little each week. · When you get a cramp, stretch and massage the muscle. You can also take a warm shower or bath to relax the muscle. A heating pad placed on the muscle can also help. · Take a daily multivitamin supplement. · Ask your doctor if you can take an over-the-counter pain medicine, such as acetaminophen (Tylenol), ibuprofen (Advil, Motrin), or naproxen (Aleve). Be safe with medicines. Read and follow all instructions on the label.   When should you call for help?  Watch closely for changes in your health, and be sure to contact your doctor if:  ? · You get muscle cramps often that do not go away after home treatment. ? · Your muscle cramps often wake you up at night. ? · You do not get better as expected. Where can you learn more? Go to http://suzanne-simran.info/. Enter Q458 in the search box to learn more about \"Muscle Cramps: Care Instructions. \"  Current as of: March 21, 2017  Content Version: 11.4  © 9237-5204 [employer]. Care instructions adapted under license by [employer] (which disclaims liability or warranty for this information). If you have questions about a medical condition or this instruction, always ask your healthcare professional. [address] any warranty or liability for your use of this information.

## 2024-06-26 ENCOUNTER — HOSPITAL ENCOUNTER (EMERGENCY)
Facility: HOSPITAL | Age: 49
Discharge: HOME | End: 2024-06-26

## 2024-06-26 VITALS
WEIGHT: 135 LBS | HEIGHT: 61 IN | DIASTOLIC BLOOD PRESSURE: 83 MMHG | OXYGEN SATURATION: 98 % | RESPIRATION RATE: 18 BRPM | SYSTOLIC BLOOD PRESSURE: 174 MMHG | HEART RATE: 82 BPM | BODY MASS INDEX: 25.49 KG/M2 | TEMPERATURE: 97.1 F

## 2024-06-26 DIAGNOSIS — S05.02XA ABRASION OF LEFT CORNEA, INITIAL ENCOUNTER: Primary | ICD-10-CM

## 2024-06-26 PROCEDURE — 6370000000 HC RX 637 (ALT 250 FOR IP)

## 2024-06-26 PROCEDURE — 99283 EMERGENCY DEPT VISIT LOW MDM: CPT

## 2024-06-26 RX ORDER — ERYTHROMYCIN 5 MG/G
1.5 OINTMENT OPHTHALMIC EVERY 6 HOURS
Qty: 3.5 G | Refills: 0 | Status: SHIPPED | OUTPATIENT
Start: 2024-06-26 | End: 2024-07-01

## 2024-06-26 RX ORDER — TETRACAINE HYDROCHLORIDE 5 MG/ML
1 SOLUTION OPHTHALMIC ONCE
Status: COMPLETED | OUTPATIENT
Start: 2024-06-26 | End: 2024-06-26

## 2024-06-26 RX ADMIN — FLUORESCEIN SODIUM 1 MG: 1 STRIP OPHTHALMIC at 08:30

## 2024-06-26 RX ADMIN — TETRACAINE HYDROCHLORIDE 1 DROP: 5 SOLUTION OPHTHALMIC at 08:08

## 2024-06-26 RX ADMIN — TETRACAINE HYDROCHLORIDE 1 DROP: 5 SOLUTION OPHTHALMIC at 08:30

## 2024-06-26 ASSESSMENT — ENCOUNTER SYMPTOMS
CONSTIPATION: 0
PHOTOPHOBIA: 1
DIARRHEA: 0
NAUSEA: 0
EYE DISCHARGE: 1
EYE PAIN: 1
ABDOMINAL PAIN: 0
COUGH: 0
CHEST TIGHTNESS: 0
VOMITING: 0
SHORTNESS OF BREATH: 0
EYE REDNESS: 1

## 2024-06-26 ASSESSMENT — PAIN DESCRIPTION - LOCATION: LOCATION: EYE

## 2024-06-26 ASSESSMENT — VISUAL ACUITY: OU: 1

## 2024-06-26 ASSESSMENT — PAIN DESCRIPTION - PAIN TYPE: TYPE: ACUTE PAIN

## 2024-06-26 ASSESSMENT — PAIN DESCRIPTION - DESCRIPTORS: DESCRIPTORS: BURNING

## 2024-06-26 ASSESSMENT — PAIN SCALES - GENERAL: PAINLEVEL_OUTOF10: 9

## 2024-06-26 ASSESSMENT — PAIN - FUNCTIONAL ASSESSMENT: PAIN_FUNCTIONAL_ASSESSMENT: 0-10

## 2024-06-26 ASSESSMENT — PAIN DESCRIPTION - ORIENTATION: ORIENTATION: RIGHT

## 2024-06-26 ASSESSMENT — PAIN DESCRIPTION - FREQUENCY: FREQUENCY: CONTINUOUS

## 2024-06-26 NOTE — ED NOTES
Pt was seen and discharged by provider.    Discharge papers with instructions given, verbalized understanding.    Ambulated to the exit without difficulty.

## 2024-06-26 NOTE — ED NOTES
Visual acuity screening done.    Both eyes: 20/20  Right eye: 20/20  Left eye: 20200 (Pt states \"The other letters are blurred except the large E\".

## 2024-06-26 NOTE — DISCHARGE INSTRUCTIONS
Placed erythromycin ointment into the left eye every 6 hours for the next 5 days.  Please call either [facility] or [facility] today in order to schedule follow-up appointment within the next 24 to 48 hours.  Return to the ED with any worsening symptoms or changes in vision.

## 2024-06-26 NOTE — ED PROVIDER NOTES
Allergies:  Allergies   Allergen Reactions    Peanut-Containing Drug Products Anaphylaxis    Morphine Hives         Review of Systems       Review of Systems   Constitutional:  Negative for chills and fever.   Eyes:  Positive for photophobia, pain, discharge and redness. Negative for visual disturbance.   Respiratory:  Negative for cough, chest tightness and shortness of breath.    Cardiovascular:  Negative for chest pain.   Gastrointestinal:  Negative for abdominal pain, constipation, diarrhea, nausea and vomiting.   Genitourinary:  Negative for dysuria and frequency.   Skin:  Negative for rash and wound.   Neurological:  Negative for dizziness, weakness, light-headedness, numbness and headaches.         Physical Exam   BP (!) 174/83   Pulse 82   Temp 97.1 °F (36.2 °C) (Oral)   Resp 18   Ht 1.549 m (5' 1\")   Wt 61.2 kg (135 lb)   SpO2 98%   BMI 25.51 kg/m²       Physical Exam  Vitals and nursing note reviewed.   Constitutional:       General: She is awake. She is not in acute distress.     Appearance: Normal appearance. She is well-developed, well-groomed and normal weight. She is not ill-appearing.   HENT:      Head: Normocephalic and atraumatic.   Eyes:      General: Lids are normal. Lids are everted, no foreign bodies appreciated. Vision grossly intact. Gaze aligned appropriately.         Left eye: No foreign body.      Extraocular Movements: Extraocular movements intact.      Pupils: Pupils are equal, round, and reactive to light.      Left eye: Corneal abrasion present. Arina exam negative.       Comments: Lids normal.  Everted without evidence of foreign body.  Vision is grossly intact.  Gaze aligned appropriately.  EOMI.  PERRL.  Left conjunctival is erythematous.  Eye anesthetized using tetracaine which significantly help with the discomfort.  Stained using fluorescein.  Does appear to be a small corneal abrasion at the 4 o'clock position just lateral to the pupil.   Cardiovascular:      Rate

## 2024-09-01 ENCOUNTER — HOSPITAL ENCOUNTER (EMERGENCY)
Facility: HOSPITAL | Age: 49
Discharge: LEFT BEFORE DISCHARGE | End: 2024-09-01
Payer: MEDICAID

## 2024-09-01 VITALS
HEART RATE: 102 BPM | BODY MASS INDEX: 25.49 KG/M2 | WEIGHT: 135 LBS | TEMPERATURE: 98.1 F | DIASTOLIC BLOOD PRESSURE: 84 MMHG | OXYGEN SATURATION: 98 % | SYSTOLIC BLOOD PRESSURE: 124 MMHG | RESPIRATION RATE: 20 BRPM | HEIGHT: 61 IN

## 2024-09-01 DIAGNOSIS — R07.0 THROAT PAIN: Primary | ICD-10-CM

## 2024-09-01 PROCEDURE — 93005 ELECTROCARDIOGRAM TRACING: CPT

## 2024-09-01 PROCEDURE — 99283 EMERGENCY DEPT VISIT LOW MDM: CPT

## 2024-09-01 PROCEDURE — 6370000000 HC RX 637 (ALT 250 FOR IP)

## 2024-09-01 RX ORDER — HYDROXYZINE HYDROCHLORIDE 25 MG/1
25 TABLET, FILM COATED ORAL
Status: COMPLETED | OUTPATIENT
Start: 2024-09-01 | End: 2024-09-01

## 2024-09-01 RX ADMIN — HYDROXYZINE HYDROCHLORIDE 25 MG: 25 TABLET, FILM COATED ORAL at 18:02

## 2024-09-01 ASSESSMENT — PAIN - FUNCTIONAL ASSESSMENT: PAIN_FUNCTIONAL_ASSESSMENT: NONE - DENIES PAIN

## 2024-09-01 NOTE — ED PROVIDER NOTES
Mood and Affect: Mood normal.         Behavior: Behavior normal.         DIAGNOSTIC RESULTS     No results found for any visits on 09/01/24.      Interpretation per the Radiologist below, if available at the time of this note:    No orders to display         ED BEDSIDE ULTRASOUND:   Performed by ED Physician - none    LABS:  Labs Reviewed - No data to display      All other labs were within normal range or not returned as of this dictation.    EMERGENCY DEPARTMENT COURSE and DIFFERENTIAL DIAGNOSIS/MDM:   Vitals:    Vitals:    09/01/24 1644 09/01/24 1645   BP:  124/84   Pulse: (!) 102    Resp: 20    Temp: 98.1 °F (36.7 °C)    SpO2: 98%    Weight: 61.2 kg (135 lb)    Height: 1.549 m (5' 1\")          Medical Decision Making  Amount and/or Complexity of Data Reviewed  Labs: ordered.  ECG/medicine tests: ordered.    Risk  Prescription drug management.      47 y/o cooperative, well groomed anxious appearing f presents to the emergency department for throat pain. Pt was cleaning her mouth pta with toothbrush and reports that she went too far back in her mouth and started to have pain to throat that radiates to back. Pt states she felt a pop and now her throat feels tight. She denies chest pain or pressure. She denies sob or back pain. She reports feeling anxious since event. She has not taken her medication for anxiety. She denies syncope, light headedness, N/V, abdominal pain or rash. She denies si/hi or a/v hallucinations. Denies dental trauma. Denies spitting up blood. No exposure to known allergens. Spa02 98 % on RA.     On exam, BBS CTA, HS regular Rhythmin with radial pulse 92. No JVD. Oropharynx with no visualized trauma, mass, erythema or lesion. Uvula midline. No swelling to tongue or oral floor. No drooling. Skin color normal for race and age. No rash. A & O X4.     DDX: Cardiac ischemia, anemia, electrolyte imbalance, allergic reaction, anxiety.     Labs, EKG, atarax.     Pt refusing labs. Reports feeling

## 2024-09-02 ASSESSMENT — ENCOUNTER SYMPTOMS
RHINORRHEA: 0
CHEST TIGHTNESS: 0
VOICE CHANGE: 0
SORE THROAT: 1
TROUBLE SWALLOWING: 0
VOMITING: 0
NAUSEA: 0
ABDOMINAL PAIN: 0
CHOKING: 0

## 2024-09-03 LAB
EKG ATRIAL RATE: 96 BPM
EKG DIAGNOSIS: NORMAL
EKG P AXIS: 64 DEGREES
EKG P-R INTERVAL: 162 MS
EKG Q-T INTERVAL: 338 MS
EKG QRS DURATION: 68 MS
EKG QTC CALCULATION (BAZETT): 427 MS
EKG R AXIS: 14 DEGREES
EKG T AXIS: 60 DEGREES
EKG VENTRICULAR RATE: 96 BPM

## 2024-09-03 PROCEDURE — 93010 ELECTROCARDIOGRAM REPORT: CPT | Performed by: INTERNAL MEDICINE
